# Patient Record
Sex: FEMALE | Race: WHITE | Employment: UNEMPLOYED | URBAN - METROPOLITAN AREA
[De-identification: names, ages, dates, MRNs, and addresses within clinical notes are randomized per-mention and may not be internally consistent; named-entity substitution may affect disease eponyms.]

---

## 2020-01-23 ENCOUNTER — HOSPITAL ENCOUNTER (OUTPATIENT)
Dept: CT IMAGING | Age: 55
Discharge: HOME | End: 2020-01-23
Attending: UROLOGY
Payer: COMMERCIAL

## 2020-01-23 DIAGNOSIS — N20.1 URETEROLITHIASIS: ICD-10-CM

## 2020-01-23 PROCEDURE — 74176 CT ABD & PELVIS W/O CONTRAST: CPT

## 2020-04-22 ENCOUNTER — HOSPITAL ENCOUNTER (OUTPATIENT)
Dept: MRI IMAGING | Age: 55
Discharge: HOME | End: 2020-04-22
Attending: ORTHOPAEDIC SURGERY
Payer: COMMERCIAL

## 2020-04-22 DIAGNOSIS — M47.22 OSTEOARTHRITIS OF SPINE WITH RADICULOPATHY, CERVICAL REGION: ICD-10-CM

## 2020-04-22 DIAGNOSIS — M54.2 CERVICALGIA: ICD-10-CM

## 2020-04-22 PROCEDURE — 72141 MRI NECK SPINE W/O DYE: CPT

## 2020-04-23 ENCOUNTER — HOSPITAL ENCOUNTER (OUTPATIENT)
Dept: GENERAL RADIOLOGY | Age: 55
Discharge: HOME | End: 2020-04-23
Attending: ORTHOPAEDIC SURGERY
Payer: COMMERCIAL

## 2020-04-23 ENCOUNTER — HOSPITAL ENCOUNTER (OUTPATIENT)
Dept: PREADMISSION TESTING | Age: 55
Discharge: HOME | End: 2020-04-23
Payer: COMMERCIAL

## 2020-04-23 VITALS
WEIGHT: 150.57 LBS | OXYGEN SATURATION: 97 % | HEART RATE: 77 BPM | DIASTOLIC BLOOD PRESSURE: 91 MMHG | SYSTOLIC BLOOD PRESSURE: 149 MMHG | BODY MASS INDEX: 27.71 KG/M2 | TEMPERATURE: 98.1 F | HEIGHT: 62 IN

## 2020-04-23 LAB
25(OH)D3 SERPL-MCNC: 37 NG/ML (ref 30–100)
ABO + RH BLD: NORMAL
ALBUMIN SERPL-MCNC: 3.9 G/DL (ref 3.5–5)
ALBUMIN/GLOB SERPL: 1.1 {RATIO} (ref 1.1–2.2)
ALP SERPL-CCNC: 79 U/L (ref 45–117)
ALT SERPL-CCNC: 38 U/L (ref 12–78)
ANION GAP SERPL CALC-SCNC: 4 MMOL/L (ref 5–15)
APPEARANCE UR: ABNORMAL
AST SERPL-CCNC: 18 U/L (ref 15–37)
BACTERIA URNS QL MICRO: ABNORMAL /HPF
BILIRUB SERPL-MCNC: 0.2 MG/DL (ref 0.2–1)
BILIRUB UR QL: NEGATIVE
BLOOD GROUP ANTIBODIES SERPL: NORMAL
BUN SERPL-MCNC: 15 MG/DL (ref 6–20)
BUN/CREAT SERPL: 23 (ref 12–20)
CALCIUM SERPL-MCNC: 9.3 MG/DL (ref 8.5–10.1)
CHLORIDE SERPL-SCNC: 106 MMOL/L (ref 97–108)
CO2 SERPL-SCNC: 29 MMOL/L (ref 21–32)
COLOR UR: ABNORMAL
CREAT SERPL-MCNC: 0.64 MG/DL (ref 0.55–1.02)
EPITH CASTS URNS QL MICRO: ABNORMAL /LPF
ERYTHROCYTE [DISTWIDTH] IN BLOOD BY AUTOMATED COUNT: 12.7 % (ref 11.5–14.5)
EST. AVERAGE GLUCOSE BLD GHB EST-MCNC: 111 MG/DL
GLOBULIN SER CALC-MCNC: 3.6 G/DL (ref 2–4)
GLUCOSE SERPL-MCNC: 102 MG/DL (ref 65–100)
GLUCOSE UR STRIP.AUTO-MCNC: NEGATIVE MG/DL
HBA1C MFR BLD: 5.5 % (ref 4–5.6)
HCG SERPL QL: NEGATIVE
HCT VFR BLD AUTO: 47.7 % (ref 35–47)
HGB BLD-MCNC: 15.6 G/DL (ref 11.5–16)
HGB UR QL STRIP: ABNORMAL
INR PPP: 1 (ref 0.9–1.1)
KETONES UR QL STRIP.AUTO: NEGATIVE MG/DL
LEUKOCYTE ESTERASE UR QL STRIP.AUTO: ABNORMAL
MCH RBC QN AUTO: 31.1 PG (ref 26–34)
MCHC RBC AUTO-ENTMCNC: 32.7 G/DL (ref 30–36.5)
MCV RBC AUTO: 95.2 FL (ref 80–99)
NITRITE UR QL STRIP.AUTO: NEGATIVE
NRBC # BLD: 0 K/UL (ref 0–0.01)
NRBC BLD-RTO: 0 PER 100 WBC
PH UR STRIP: 7.5 [PH] (ref 5–8)
PLATELET # BLD AUTO: 307 K/UL (ref 150–400)
PMV BLD AUTO: 11.9 FL (ref 8.9–12.9)
POTASSIUM SERPL-SCNC: 4.1 MMOL/L (ref 3.5–5.1)
PREALB SERPL-MCNC: 29.3 MG/DL (ref 20–40)
PROT SERPL-MCNC: 7.5 G/DL (ref 6.4–8.2)
PROT UR STRIP-MCNC: NEGATIVE MG/DL
PROTHROMBIN TIME: 10.4 SEC (ref 9–11.1)
RBC # BLD AUTO: 5.01 M/UL (ref 3.8–5.2)
RBC #/AREA URNS HPF: ABNORMAL /HPF (ref 0–5)
SODIUM SERPL-SCNC: 139 MMOL/L (ref 136–145)
SP GR UR REFRACTOMETRY: 1.01 (ref 1–1.03)
SPECIMEN EXP DATE BLD: NORMAL
UA: UC IF INDICATED,UAUC: ABNORMAL
UROBILINOGEN UR QL STRIP.AUTO: 1 EU/DL (ref 0.2–1)
WBC # BLD AUTO: 9.8 K/UL (ref 3.6–11)
WBC URNS QL MICRO: ABNORMAL /HPF (ref 0–4)

## 2020-04-23 PROCEDURE — 36415 COLL VENOUS BLD VENIPUNCTURE: CPT

## 2020-04-23 PROCEDURE — 84134 ASSAY OF PREALBUMIN: CPT

## 2020-04-23 PROCEDURE — 80053 COMPREHEN METABOLIC PANEL: CPT

## 2020-04-23 PROCEDURE — 97116 GAIT TRAINING THERAPY: CPT

## 2020-04-23 PROCEDURE — 87086 URINE CULTURE/COLONY COUNT: CPT

## 2020-04-23 PROCEDURE — 93005 ELECTROCARDIOGRAM TRACING: CPT

## 2020-04-23 PROCEDURE — 85610 PROTHROMBIN TIME: CPT

## 2020-04-23 PROCEDURE — 82306 VITAMIN D 25 HYDROXY: CPT

## 2020-04-23 PROCEDURE — 86900 BLOOD TYPING SEROLOGIC ABO: CPT

## 2020-04-23 PROCEDURE — 97161 PT EVAL LOW COMPLEX 20 MIN: CPT

## 2020-04-23 PROCEDURE — 83036 HEMOGLOBIN GLYCOSYLATED A1C: CPT

## 2020-04-23 PROCEDURE — 71046 X-RAY EXAM CHEST 2 VIEWS: CPT

## 2020-04-23 PROCEDURE — 81001 URINALYSIS AUTO W/SCOPE: CPT

## 2020-04-23 PROCEDURE — 85027 COMPLETE CBC AUTOMATED: CPT

## 2020-04-23 PROCEDURE — 84703 CHORIONIC GONADOTROPIN ASSAY: CPT

## 2020-04-23 RX ORDER — PREGABALIN 150 MG/1
150 CAPSULE ORAL ONCE
Status: CANCELLED | OUTPATIENT
Start: 2020-04-28 | End: 2020-04-28

## 2020-04-23 RX ORDER — ACETAMINOPHEN 10 MG/ML
1000 INJECTION, SOLUTION INTRAVENOUS ONCE
Status: CANCELLED | OUTPATIENT
Start: 2020-04-28 | End: 2020-04-28

## 2020-04-23 RX ORDER — SODIUM CHLORIDE, SODIUM LACTATE, POTASSIUM CHLORIDE, CALCIUM CHLORIDE 600; 310; 30; 20 MG/100ML; MG/100ML; MG/100ML; MG/100ML
25 INJECTION, SOLUTION INTRAVENOUS CONTINUOUS
Status: CANCELLED | OUTPATIENT
Start: 2020-04-28

## 2020-04-23 RX ORDER — ACETAMINOPHEN AND CODEINE PHOSPHATE 120; 12 MG/5ML; MG/5ML
SOLUTION ORAL DAILY
COMMUNITY

## 2020-04-23 NOTE — PROGRESS NOTES
[facility]  Physical Therapy Pre-surgery evaluation  [address] [facility], [address] [address]    PHYSICAL THERAPY PRE SPINE SURGERY EVALUATION  Patient: Ioana Matta (77 y.o. female)  Date: 4/23/2020  Primary Diagnosis: PAT TESTING       Precautions:        ASSESSMENT :  Based on the objective data described below, the patient presents with impaired sensation with intermittent neck and arm pain due to end stage degenerative joint disease in the cervical spine. Discussed anticipated disposition to home with possible discharge within a 1 to 2 day time frame post-surgery. Patient and  in agreement. Anticipate patient will need acute PT and OT orders based on expected deficits post surgery to reinforce BLT precautions and re-assess ADL/mobility skills. GOALS: (Goals have been discussed and agreed upon with patient.)  DISCHARGE GOALS: Time Frame: 1 DAY  1. Patient will demonstrate increased strength, range of motion, and pain control via a home exercise program in order to minimize functional deficits in preparation for their upcoming surgery. This will be achieved by using education, demonstration  and through the use of an informational handout including a home exercise program.  REHABILITATION POTENTIAL FOR STATED GOALS: Good     RECOMMENDATIONS AND PLANNED INTERVENTIONS: (Benefits and precautions of physical therapy have been discussed with the patient.)  · Home Exercise Program  TREATMENT PLAN EFFECTIVE DATES: 4/23/2020 to 4/23/2020  FREQUENCY/DURATION: Patient to continue to perform home exercise program at least twice daily until surgery. SUBJECTIVE:   Patient stated the doctor says I will spend the night after surgery.     OBJECTIVE DATA SUMMARY:   HISTORY:      Past Medical History:   Diagnosis Date    Anxiety     Hypertension     Ill-defined condition 2020    kidney stones     Past Surgical History:   Procedure Laterality Date    HX GYN  1988    c/section under GA    HX HEENT  1986    tonsilectomy       Prior Level of Function/Home Situation: independent all ADLs and walking without devices, but pain in neck increases with any house cleaning or activities that involve prolonged use of UEs. Personal factors and/or comorbidities impacting plan of care: anxiety per patient. Home Situation  Home Environment: Private residence  # Steps to Enter: 3  Rails to Enter: Yes  Hand Rails : Left  Wheelchair Ramp: No  One/Two Story Residence: Two story  # of Interior Steps: 12  Interior Rails: Both  Lift Chair Available: No  Living Alone: No  Support Systems: Spouse/Significant Other/Partner, Child(ct), Friends \ neighbors, Family member(s)  Patient Expects to be Discharged to[de-identified] Private residence  Current DME Used/Available at Home: None  Tub or Shower Type: Tub/Shower combination    EXAMINATION/PRESENTATION/DECISION MAKING:     ADLs (Current Functional Status): Bathing/Showering:   [x] Independent  [] Requires Assistance from Someone  [] Sponge Bath Only   Ambulation:  [x] Independent  [x] Walk Indoors Only  [x] Walk Outdoors  [] Use Assistive Device  [] Use Wheelchair Only     Dressing:  [x] Independent    Requires Assistance from Someone for:  [] Sock/Shoes  [] Pants  [] Everything   Household Activities:  [] Routine house and yard work  [x] Light Housework Only  [] None         Critical Behavior:  Neurologic State: Alert  Orientation Level: Oriented X4          Strength:    Strength:  Within functional limits                        Tone & Sensation:   Tone: Normal              Sensation: Impaired(numbness B hands)                   Range Of Motion:  AROM: Within functional limits           PROM: Within functional limits           Coordination:  Coordination: Within functional limits    Functional Mobility:  Transfers:  Sit to Stand: Independent  Stand to Sit: Independent        Bed to Chair: Independent              Balance:   Sitting: Intact  Standing: Intact  Ambulation/Gait Training:  Distance (ft): 150 Feet (ft)     Ambulation - Level of Assistance: Independent     Gait Description (WDL): Within defined limits(normal gait pattern)     Right Side Weight Bearing: Full  Left Side Weight Bearing: Full                         Functional Measure:  10 Meter walk test:  (Specify if any supplemental oxygen is used, the type, pre, during and post sats.)    Self-Selected Or Fast-Velocity: Self Selected Velocity  Trial 1 (Time to Walk 10 Meters): 7.3 Seconds  Trial 2 (Time to Walk 10 Meters): 6.7 Seconds  Trial 3 (Time to Walk 10 Meters): 6.6 Seconds  Average : 6.9 Seconds  Score (Meters/Second): 1.4 Meters/Second             Walking Speed (m/s)  Modifier Scale Age 52-63 Age 61-76 Age 66-77 Age 80-80    Male Female Male Female Male Female Male Female   CH   0% Impaired ? 1.39 ? 1.40 ? 1.36 ? 1.30 ? 1.33 ? 1.27 ? 1.21 ? 1.15   CI   1-19% Impaired 1.11-1.38 1.12-1.39 1.09-1.35 1.04-1.29 1.06-1.32 1.01-1.26 0.96-1.20 0.92-1.14   CJ   20-39% Impaired 0.83-1.10 0.84-1.11 0.82-1.08 0.78-1.03 0.80-1.05 0.76-1.00 0.72-0.95 0.69-0.91   CK   40-59% Impaired 0.56-0.82 0.57-0.83 0.54-0.81 0.52-0.77 0.53-0.79 0.51-0.75 0.48-0.71 0.46-0.68   CL   60-79% Impaired 0.28-0.55 0.28-0.56 0.27-0.53 0.26-0.51 0.27-0.52 0.25-0.50 0.24-0.49 0.23-0.45   CM   80-99% Impaired 0.01-0.28 < 0.01-0.28 < 0.01-0.27 < 0.01-0.26 0.01-0.27 0.01-0.24 0.01-0.23 0.01-0.22   CN   100% Impaired Cannot Perform   Minimal Detectable Change (MDC-90) = 0.1 m/s  [employer] ISMS \"Comfortable and maximum walking speed of adults aged 20-79 years: reference values and determinants. \" Age and Agin Volume 26(1):15-9. Candido Franklin. \"Age- and gender-related test performance in community-dwelling elderly people: Six-Minute Walk Test, Manzano Balance Scale, Timed Up & Go Test, and gait speeds. \" Physical Therapy: 2002 Volume 82(2):128-37. Zoranfalguni Pretty DM, Irene VICK, Gene Mi JD, [facility] JONES.  \"Assessing stability and change of four performance measures: a longitudinal study evaluating outcome following total hip and knee arthroplasty. \" [facility] Musculoskeletal Disorders: 2005 Volume 6(3). Estuardo Ahuja, PhD; Lisandro Gonzalez, PhD. Johnnie Hernandez Paper: \"Walking Speed: the Sixth Vital Sign\" Journal of Geriatric Physical Therapy: 2009 - Volume 32 - Issue 2 - p 25 . Pain:  Pain Scale 1: Numeric (0 - 10)  Pain Intensity 1: 7  Pain Location 1: Hand  Pain Orientation 1: Left;Right  Pain Description 1: Tingling;Numb       Radicular Pain:   Intermittent from neck to either arm/hands    Activity Tolerance:   Good. Patient []   does  [x]   does not demonstrate signs/symptoms of shortness of breath/dyspnea on exertion/respiratory distress. COMMUNICATION/EDUCATION:   The patient was educated on:  [x]         Early post operative mobility is imperative to achieve a patient's desired outcomes and to restore biological function. [x]         Post operative spinal precautions may/may not be applicable. These precautions are based on the patient's physician and the procedure(s) performed. [x]         Spinal precautions including:  ·   No bending forward, sideways, or backwards  ·   No twisting   ·   No lifting more than 5-10 pounds  ·   No sitting longer than 30-60 minutes at a time  ·   Cervical aspen collar should be on at all times    The patients plan of care was discussed as follows:   [x]         The patient verbalized understanding of her plan in preparation for their upcoming surgery  []         The patient's  was present for this session  [x]        The patient reports that he/she does not have a  identified at this time  []         The  verbalized understanding of the education regarding the patient's upcoming surgery  [x]         Patient/family agree to work toward stated goals and plan of care.   []         Patient understands intent and goals of therapy, but is neutral about his/her participation. []         Patient is unable to participate in goal setting and plan of care.       Thank you for this referral.  Winnie Ballard, PT    Time Calculation: 14 mins

## 2020-04-23 NOTE — PERIOP NOTES
[facility]  Joint/Spine Preoperative Instructions        Surgery Date April 28,20       Time of Garland Petersen  Contact # [phone]  1. On the day of your surgery, please report to the Surgical Services Registration Desk and sign in at your designated time. The Surgery Center is located to the right of the Emergency Room. 2. You must have someone with you to drive you home. You should not drive a car for 24 hours following surgery. Please make arrangements for a friend or family member to stay with you for the first 24 hours after your surgery. 3. No food after midnight 4/27/20*. Medications morning of surgery should be taken with a sip of water. Please follow pre-surgery drink instructions that were given at your Pre Admission Testing appointment. 4. We recommend you do not drink any alcoholic beverages for 24 hours before and after your surgery. 5. Contact your surgeons office for instructions on the following medications: non-steroidal anti-inflammatory drugs (i.e. Advil, Aleve), vitamins, and supplements. (Some surgeons will want you to stop these medications prior to surgery and others may allow you to take them)  **If you are currently taking Plavix, Coumadin, Aspirin and/or other blood-thinning agents, contact your surgeon for instructions. ** Your surgeon will partner with the physician prescribing these medications to determine if it is safe to stop or if you need to continue taking. Please do not stop taking these medications without instructions from your surgeon    6. Wear comfortable clothes. Wear glasses instead of contacts. Do not bring any money or jewelry. Please bring picture ID, insurance card, and any prearranged co-payment or hospital payment. Do not wear make-up, particularly mascara the morning of your surgery. Do not wear nail polish, particularly if you are having foot /hand surgery.   Wear your hair loose or down, no ponytails, buns, stanford pins or clips. All body piercings must be removed. Please shower with antibacterial soap for three consecutive days before and on the morning of surgery, but do not apply any lotions, powders or deodorants after the shower on the day of surgery. Please use a fresh towels after each shower. Please sleep in clean clothes and change bed linens the night before surgery. Please do not shave for 48 hours prior to surgery. Shaving of the face is acceptable. 7. You should understand that if you do not follow these instructions your surgery may be cancelled. If your physical condition changes (I.e. fever, cold or flu) please contact your surgeon as soon as possible. 8. It is important that you be on time. If a situation occurs where you may be late, please call [phone] (OR Holding Area). 9. If you have any questions and or problems, please call [phone] (Pre-admission Testing). 10. Your surgery time may be subject to change. You will receive a phone call the evening prior if your time changes. 11.  If having outpatient surgery, you must have someone to drive you here, stay with you during the duration of your stay, and to drive you home at time of discharge. 12.   In an effort to improve the efficiency, privacy, and safety for all of our Pre-op patients visitors are not allowed in the Holding area. Once you arrive and are registered your family/visitors will be asked to remain in the waiting room. The Pre-op staff will get you from the Surgical Waiting Area and will explain to you and your family/visitors that the Pre-op phase is beginning. The staff will answer any questions and provide instructions for tracking of the patient, by use of the existing tracking number and color-coded status board in the waiting room.   At this time the staff will also ask for your designated spokesperson information in the event that the physician or staff need to provide an update or obtain any pertinent information. The designated spokesperson will be notified if the physician needs to speak to family during the pre-operative phase. If at any time your family/visitors has questions or concerns they may approach the volunteer desk in the waiting area for assistance. Special Instructions:   As a precaution [facility] has implemented a restricted visitation policy to limit to 1 visitor per patient. No visitors under the age of 15 will be permitted in the Hospital.  If you are ill, please call to reschedule your procedure. (For pediatric patients, 2 visitors per patient)  Follow surgeon instructions  Practice Incentive Spirometry twice per day ( ten times each)  TAKE ALL MEDICATIONS THE DAY OF SURGERY EXCEPT:none      I understand a pre-operative phone call will be made to verify my surgery time. In the event that I am not available, I give permission for a message to be left on my answering service and/or with another person?  yes         ___________________      __________   _________    (Signature of Patient)             (Witness)                (Date and Time)

## 2020-04-23 NOTE — PERIOP NOTES
Incentive Spirometer        Using the incentive spirometer helps expand the small air sacs of your lungs, helps you breathe deeply, and helps improve your lung function. Use your incentive spirometer twice a day (10 breaths each time) prior to surgery. How to Use Your Incentive Spirometer:  1. Hold the incentive spirometer in an upright position. 2. Breathe out as usual.   3. Place the mouthpiece in your mouth and seal your lips tightly around it. 4. Take a deep breath. Breathe in slowly and as deeply as possible. Keep the blue flow rate guide between the arrows. 5. Hold your breath as long as possible. Then exhale slowly and allow the piston to fall to the bottom of the column. 6. Rest for a few seconds and repeat steps one through five at least 10 times. PAT Tidal Volume_________2000_________  x________2________  Date________4/23/20_______________    Vernona Generous THE INCENTIVE SPIROMETER WITH YOU TO THE HOSPITAL ON THE DAY OF YOUR SURGERY. Opportunity given to ask and answer questions as well as to observe return demonstration.     Patient signature_____________________________    Witness____________________________

## 2020-04-23 NOTE — PERIOP NOTES
Hibiclens/Chlorhexidine    Preventing Infections Before and After  Your Surgery    IMPORTANT INSTRUCTIONS    Please read and follow these instructions carefully. If you are unable to comply with the below instructions your procedure will be cancelled. Every Night for Three (3) nights before your surgery:  1. Shower with an antibacterial soap, such as Dial, or the soap provided at your preassessment appointment. A shower is better than a bath for cleaning your skin. 2. If needed, ask someone to help you reach all areas of your body. Dont forget to clean your belly button with every shower. The night before your surgery: If you lose your Hibiclens/chlorhexidine please contact surgery center or you can purchase it at a local pharmacy  1. On the night before your surgery, shower with an antibacterial soap, such as Dial, or the soap provided at your preassessment appointment. 2. With one packet of Hibiclens/Chlorhexidine in hand, turn water off.  3. Apply Hibiclens antiseptic skin cleanser with a clean, freshly washed washcloth. ? Gently apply to your body from chin to toes (except the genital area) and especially the area(s) where your incision(s) will be. ? Leave Hibiclens/Chlorhexidine on your skin for at least 20 seconds. CAUTION: If needed, Hibiclens/chlorhexidine may be used to clean the folds of skin of the legs (such as in the area of the groin) and on your buttocks and hips. However, do not use Hibiclens/Chlorhexidine above the neck or in the genital area (your bottom) or put inside any area of your body. 4. Turn the water back on and rinse. 5. Dry gently with a clean, freshly washed towel. 6. After your shower, do not use any powder, deodorant, perfumes or lotion. 7. Use clean, freshly washed towels and washcloths every time you shower. 8. Wear clean, freshly washed pajamas to bed the night before surgery. 9. Sleep on clean, freshly washed sheets.   10. Do not allow pets to sleep in your bed with you. The Morning of your surgery:  1. Shower again thoroughly with an antibacterial soap, such as Dial or the soap provided at your preassessment appointment. If needed, ask someone for help to reach all areas of your body. Dont forget to clean your belly button! Rinse. 2. Dry gently with a clean, freshly washed towel. 3. After your shower, do not use any powder, deodorant, perfumes or lotion prior to surgery. 4. Put on clean, freshly washed clothing. Tips to help prevent infections after your surgery:  1. Protect your surgical wound from germs:  ? Hand washing is the most important thing you and your caregivers can do to prevent infections. ? Keep your bandage clean and dry! ? Do not touch your surgical wound. 2. Use clean, freshly washed towels and washcloths every time you shower; do not share bath linens with others. 3. Until your surgical wound is healed, wear clothing and sleep on bed linens each day that are clean and freshly washed. 4. Do not allow pets to sleep in your bed with you or touch your surgical wound. 5. Do not smoke  smoking delays wound healing. This may be a good time to stop smoking. 6. If you have diabetes, it is important for you to manage your blood sugar levels properly before your surgery as well as after your surgery. Poorly managed blood sugar levels slow down wound healing and prevent you from healing completely. If you lose your Hibiclens/chlorhexidine, please call the [facility], or it is available for purchase at your pharmacy.                ___________________      ___________________      ________________  (Signature of Patient)          (Witness)                   (Date and Time)

## 2020-04-23 NOTE — PERIOP NOTES

## 2020-04-23 NOTE — ADVANCED PRACTICE NURSE
PAT Nurse Practitioner   Pre-Operative Chart Review/Assessment:-ORTHOPEDIC/NEUROSURGICAL SPINE                Patient Name:  Marck Perez                                                         Age:   47 y.o.    :  1965     Today's Date:  2020     Date of PAT:   20      Date of Surgery:    20     Procedure(s):  C4-7 ACDF     Surgeon:   Cynthia Suárez     Medical Clearance:  PCP is Dr. Maria Luisa Mulligan:      1)  Cardiac Clearance:  Not requested       2)  Diabetic Treatment Consult:  Not indicated-A1C 5.5      3)  Sleep Apnea evaluation:   Not indicated-BLAISE 1      4) Treatment for MRSA/Staph Aureus:  Negative       5) Additional Concerns:  Anxiety                Vital Signs:         Vitals:    20 1321   BP: (!) 149/91   Pulse: 77   Temp: 98.1 °F (36.7 °C)   SpO2: 97%   Weight: 68.3 kg (150 lb 9.2 oz)   Height: 5' 1.5\" (1.562 m)   LMP: 2020            ____________________________________________  PAST MEDICAL HISTORY  Past Medical History:   Diagnosis Date    Anxiety     Hypertension     Ill-defined condition     kidney stones      ____________________________________________  PAST SURGICAL HISTORY  Past Surgical History:   Procedure Laterality Date    HX GYN      c/section under GA    HX HEENT      tonsilectomy      ____________________________________________  HOME MEDICATIONS    Current Outpatient Medications   Medication Sig    norethindrone (MICRONOR) 0.35 mg tab Take  by mouth daily.  diazepam (VALIUM) 5 mg tablet Take 1 Tab by mouth every eight (8) hours as needed for Anxiety.      No current facility-administered medications for this encounter.       ____________________________________________  ALLERGIES  Allergies   Allergen Reactions    Penicillin G Hives     Old allergy - does not recall if keflex      ____________________________________________  SOCIAL HISTORY  Social History     Tobacco Use    Smoking status: Light Tobacco Smoker Packs/day: 0.25     Years: 25.00     Pack years: 6.25    Smokeless tobacco: Never Used   Substance Use Topics    Alcohol use: Not Currently      ____________________________________________        Labs:   Results for Yumiko Gutierrez (MRN [id number]) as of 4/27/2020 07:25   Ref.  Range 4/22/2020 13:59 4/23/2020 12:54   WBC Latest Ref Range: 3.6 - 11.0 K/uL  9.8   NRBC Latest Ref Range: 0  WBC  0.0   RBC Latest Ref Range: 3.80 - 5.20 M/uL  5.01   HGB Latest Ref Range: 11.5 - 16.0 g/dL  15.6   HCT Latest Ref Range: 35.0 - 47.0 %  47.7 (H)   MCV Latest Ref Range: 80.0 - 99.0 FL  95.2   MCH Latest Ref Range: 26.0 - 34.0 PG  31.1   MCHC Latest Ref Range: 30.0 - 36.5 g/dL  32.7   RDW Latest Ref Range: 11.5 - 14.5 %  12.7   PLATELET Latest Ref Range: 150 - 400 K/uL  307   MPV Latest Ref Range: 8.9 - 12.9 FL  11.9   ABSOLUTE NRBC Latest Ref Range: 0.00 - 0.01 K/uL  0.00   Color Latest Units:    YELLOW/STRAW   Appearance Latest Ref Range: CLEAR    CLOUDY (A)   Specific gravity Latest Ref Range: 1.003 - 1.030    1.014   pH (UA) Latest Ref Range: 5.0 - 8.0    7.5   Protein Latest Ref Range: NEG mg/dL  Negative   Glucose Latest Ref Range: NEG mg/dL  Negative   Ketone Latest Ref Range: NEG mg/dL  Negative   Blood Latest Ref Range: NEG    SMALL (A)   Bilirubin Latest Ref Range: NEG    Negative   Urobilinogen Latest Ref Range: 0.2 - 1.0 EU/dL  1.0   Nitrites Latest Ref Range: NEG    Negative   Leukocyte Esterase Latest Ref Range: NEG    TRACE (A)   Epithelial cells Latest Ref Range: FEW /lpf  FEW   WBC Latest Ref Range: 0 - 4 /hpf  5-10   RBC Latest Ref Range: 0 - 5 /hpf  10-20   Bacteria Latest Ref Range: NEG /hpf  1+ (A)   INR Latest Ref Range: 0.9 - 1.1    1.0   Prothrombin time Latest Ref Range: 9.0 - 11.1 sec  10.4   Sodium Latest Ref Range: 136 - 145 mmol/L  139   Potassium Latest Ref Range: 3.5 - 5.1 mmol/L  4.1   Chloride Latest Ref Range: 97 - 108 mmol/L  106   CO2 Latest Ref Range: 21 - 32 mmol/L  29   Anion gap Latest Ref Range: 5 - 15 mmol/L  4 (L)   Glucose Latest Ref Range: 65 - 100 mg/dL  102 (H)   BUN Latest Ref Range: 6 - 20 MG/DL  15   Creatinine Latest Ref Range: 0.55 - 1.02 MG/DL  0.64   BUN/Creatinine ratio Latest Ref Range: 12 - 20    23 (H)   Calcium Latest Ref Range: 8.5 - 10.1 MG/DL  9.3   GFR est non-AA Latest Ref Range: >60 ml/min/1.73m2  >60   GFR est AA Latest Ref Range: >60 ml/min/1.73m2  >60   Bilirubin, total Latest Ref Range: 0.2 - 1.0 MG/DL  0.2   Protein, total Latest Ref Range: 6.4 - 8.2 g/dL  7.5   Albumin Latest Ref Range: 3.5 - 5.0 g/dL  3.9   Globulin Latest Ref Range: 2.0 - 4.0 g/dL  3.6   A-G Ratio Latest Ref Range: 1.1 - 2.2    1.1   ALT (SGPT) Latest Ref Range: 12 - 78 U/L  38   AST Latest Ref Range: 15 - 37 U/L  18   Alk.  phosphatase Latest Ref Range: 45 - 117 U/L  79   Hemoglobin A1c, (calculated) Latest Ref Range: 4.0 - 5.6 %  5.5   Est. average glucose Latest Units: mg/dL  111   HCG, Ql. Latest Ref Range: NEG    Negative   Prealbumin Latest Ref Range: 20.0 - 40.0 mg/dL  29.3   CULTURE, URINE Unknown  Rpt   CULTURE, MRSA Unknown  Rpt   Crossmatch Expiration Unknown  05/01/2020   TYPE & SCREEN Unknown  Rpt   Vitamin D 25-Hydroxy Latest Ref Range: 30 - 100 ng/mL  37.0   VITAMIN D, 25 HYDROXY Unknown  Rpt   MRI CERV SPINE WO CONT Unknown Rpt        CULTURE, URINE [[id number]] (Order [id number])   Microbiology   Date: 4/23/2020 Department: [facility] Preadmit Tsting Released By:  (auto-released) Authorizing: Mavis Menjivar MD   Specimen Information: Urine        Component Value Flag Ref Range Units Status   Special Requests:      Final   NO SPECIAL REQUESTS   Reflexed from [id number]    Culture result:      Final   No significant growth, <10,000 CFU/mL    Lab and Collection     CULTURE, URINE (Order: [id number]) - 4/23/2020   Result History     CULTURE, URINE (Order #[id number]) on 4/24/2020 - Order Result History Report   4/24/2020  2:39 PM - Fredo, Lab In [email or web address]     Specimen Information     Urine Collected: 4/23/2020 1254       Final Report Date/time     Reported date Reported time   Apr 24, 2020 14:39 EDT         Skin:   Denies open wounds, cuts, sores, rashes or other areas of concern in PAT assessment       Merly Franco NP

## 2020-04-24 LAB
ATRIAL RATE: 82 BPM
BACTERIA SPEC CULT: NORMAL
CALCULATED P AXIS, ECG09: 63 DEGREES
CALCULATED R AXIS, ECG10: 30 DEGREES
CALCULATED T AXIS, ECG11: 58 DEGREES
DIAGNOSIS, 93000: NORMAL
P-R INTERVAL, ECG05: 146 MS
Q-T INTERVAL, ECG07: 372 MS
QRS DURATION, ECG06: 72 MS
QTC CALCULATION (BEZET), ECG08: 434 MS
SERVICE CMNT-IMP: NORMAL
SERVICE CMNT-IMP: NORMAL
VENTRICULAR RATE, ECG03: 82 BPM

## 2020-04-27 NOTE — PERIOP NOTES
Phone call made to [facility] to check on medical clearance- spoke to Carson Song and she states she will look into this and give PAT a call back

## 2020-04-28 ENCOUNTER — ANESTHESIA EVENT (OUTPATIENT)
Dept: SURGERY | Age: 55
DRG: 472 | End: 2020-04-28
Payer: COMMERCIAL

## 2020-04-28 ENCOUNTER — APPOINTMENT (OUTPATIENT)
Dept: GENERAL RADIOLOGY | Age: 55
DRG: 472 | End: 2020-04-28
Attending: ORTHOPAEDIC SURGERY
Payer: COMMERCIAL

## 2020-04-28 ENCOUNTER — ANESTHESIA (OUTPATIENT)
Dept: SURGERY | Age: 55
DRG: 472 | End: 2020-04-28
Payer: COMMERCIAL

## 2020-04-28 ENCOUNTER — HOSPITAL ENCOUNTER (INPATIENT)
Age: 55
LOS: 2 days | Discharge: HOME | DRG: 472 | End: 2020-04-30
Attending: ORTHOPAEDIC SURGERY | Admitting: ORTHOPAEDIC SURGERY
Payer: COMMERCIAL

## 2020-04-28 DIAGNOSIS — Z98.1 S/P CERVICAL SPINAL FUSION: Primary | ICD-10-CM

## 2020-04-28 PROBLEM — M48.02 CERVICAL STENOSIS OF SPINAL CANAL: Status: ACTIVE | Noted: 2020-04-28

## 2020-04-28 PROCEDURE — 77030012407 HC DRN WND BARD -B: Performed by: ORTHOPAEDIC SURGERY

## 2020-04-28 PROCEDURE — 76060000035 HC ANESTHESIA 2 TO 2.5 HR: Performed by: ORTHOPAEDIC SURGERY

## 2020-04-28 PROCEDURE — 0RT30ZZ RESECTION OF CERVICAL VERTEBRAL DISC, OPEN APPROACH: ICD-10-PCS | Performed by: ORTHOPAEDIC SURGERY

## 2020-04-28 PROCEDURE — 77030003028 HC SUT VCRL J&J -A: Performed by: ORTHOPAEDIC SURGERY

## 2020-04-28 PROCEDURE — 77030008684 HC TU ET CUF COVD -B: Performed by: NURSE ANESTHETIST, CERTIFIED REGISTERED

## 2020-04-28 PROCEDURE — L0180 CER POST COL OCC/MAN SUP ADJ: HCPCS | Performed by: ORTHOPAEDIC SURGERY

## 2020-04-28 PROCEDURE — 74011250636 HC RX REV CODE- 250/636: Performed by: NURSE ANESTHETIST, CERTIFIED REGISTERED

## 2020-04-28 PROCEDURE — 0BH17EZ INSERTION OF ENDOTRACHEAL AIRWAY INTO TRACHEA, VIA NATURAL OR ARTIFICIAL OPENING: ICD-10-PCS | Performed by: ANESTHESIOLOGY

## 2020-04-28 PROCEDURE — 77030018846 HC SOL IRR STRL H20 ICUM -A: Performed by: ORTHOPAEDIC SURGERY

## 2020-04-28 PROCEDURE — 77030041248 HC GRFT BN OSTEOAMP BTUS -G: Performed by: ORTHOPAEDIC SURGERY

## 2020-04-28 PROCEDURE — 77030008462 HC STPLR SKN PROX J&J -A: Performed by: ORTHOPAEDIC SURGERY

## 2020-04-28 PROCEDURE — 77030005513 HC CATH URETH FOL11 MDII -B: Performed by: ORTHOPAEDIC SURGERY

## 2020-04-28 PROCEDURE — 77030034475 HC MISC IMPL SPN: Performed by: ORTHOPAEDIC SURGERY

## 2020-04-28 PROCEDURE — 77030031139 HC SUT VCRL2 J&J -A: Performed by: ORTHOPAEDIC SURGERY

## 2020-04-28 PROCEDURE — 77030040356 HC CORD BPLR FRCP COVD -A: Performed by: ORTHOPAEDIC SURGERY

## 2020-04-28 PROCEDURE — 76010000171 HC OR TIME 2 TO 2.5 HR INTENSV-TIER 1: Performed by: ORTHOPAEDIC SURGERY

## 2020-04-28 PROCEDURE — C1713 ANCHOR/SCREW BN/BN,TIS/BN: HCPCS | Performed by: ORTHOPAEDIC SURGERY

## 2020-04-28 PROCEDURE — 77030040361 HC SLV COMPR DVT MDII -B: Performed by: ORTHOPAEDIC SURGERY

## 2020-04-28 PROCEDURE — 77030008684 HC TU ET CUF COVD -B: Performed by: ANESTHESIOLOGY

## 2020-04-28 PROCEDURE — 77030026438 HC STYL ET INTUB CARD -A: Performed by: NURSE ANESTHETIST, CERTIFIED REGISTERED

## 2020-04-28 PROCEDURE — 77030018842 HC SOL IRR SOD CL 9% BAXT -A: Performed by: ORTHOPAEDIC SURGERY

## 2020-04-28 PROCEDURE — 74011000250 HC RX REV CODE- 250: Performed by: NURSE ANESTHETIST, CERTIFIED REGISTERED

## 2020-04-28 PROCEDURE — 76000 FLUOROSCOPY <1 HR PHYS/QHP: CPT

## 2020-04-28 PROCEDURE — 77030029099 HC BN WAX SSPC -A: Performed by: ORTHOPAEDIC SURGERY

## 2020-04-28 PROCEDURE — 77030004402 HC BUR NEUR STRY -C: Performed by: ORTHOPAEDIC SURGERY

## 2020-04-28 PROCEDURE — 76210000006 HC OR PH I REC 0.5 TO 1 HR: Performed by: ORTHOPAEDIC SURGERY

## 2020-04-28 PROCEDURE — 77030038933 HC CGE SPN FORTCR NANV -G: Performed by: ORTHOPAEDIC SURGERY

## 2020-04-28 PROCEDURE — 77030033138 HC SUT PGA STRATFX J&J -B: Performed by: ORTHOPAEDIC SURGERY

## 2020-04-28 PROCEDURE — 5A1935Z RESPIRATORY VENTILATION, LESS THAN 24 CONSECUTIVE HOURS: ICD-10-PCS | Performed by: ANESTHESIOLOGY

## 2020-04-28 PROCEDURE — 77030003029 HC SUT VCRL J&J -B: Performed by: ORTHOPAEDIC SURGERY

## 2020-04-28 PROCEDURE — 77030012961 HC IRR KT CYSTO/TUR ICUM -A: Performed by: ORTHOPAEDIC SURGERY

## 2020-04-28 PROCEDURE — 77030018836 HC SOL IRR NACL ICUM -A: Performed by: ORTHOPAEDIC SURGERY

## 2020-04-28 PROCEDURE — 77030002996 HC SUT SLK J&J -A: Performed by: ORTHOPAEDIC SURGERY

## 2020-04-28 PROCEDURE — 77030039267 HC ADH SKN EXOFIN S2SG -B: Performed by: ORTHOPAEDIC SURGERY

## 2020-04-28 PROCEDURE — 77030020268 HC MISC GENERAL SUPPLY: Performed by: ORTHOPAEDIC SURGERY

## 2020-04-28 PROCEDURE — 77030019908 HC STETH ESOPH SIMS -A: Performed by: NURSE ANESTHETIST, CERTIFIED REGISTERED

## 2020-04-28 PROCEDURE — 74011250637 HC RX REV CODE- 250/637: Performed by: ORTHOPAEDIC SURGERY

## 2020-04-28 PROCEDURE — 0RG20A0 FUSION OF 2 OR MORE CERVICAL VERTEBRAL JOINTS WITH INTERBODY FUSION DEVICE, ANTERIOR APPROACH, ANTERIOR COLUMN, OPEN APPROACH: ICD-10-PCS | Performed by: ORTHOPAEDIC SURGERY

## 2020-04-28 PROCEDURE — 65610000006 HC RM INTENSIVE CARE

## 2020-04-28 PROCEDURE — 76060000033 HC ANESTHESIA 1 TO 1.5 HR: Performed by: ORTHOPAEDIC SURGERY

## 2020-04-28 PROCEDURE — 74011000250 HC RX REV CODE- 250: Performed by: ORTHOPAEDIC SURGERY

## 2020-04-28 PROCEDURE — 74011250636 HC RX REV CODE- 250/636: Performed by: ANESTHESIOLOGY

## 2020-04-28 PROCEDURE — 77030011267 HC ELECTRD BLD COVD -A: Performed by: ORTHOPAEDIC SURGERY

## 2020-04-28 PROCEDURE — 0JC50ZZ EXTIRPATION OF MATTER FROM LEFT NECK SUBCUTANEOUS TISSUE AND FASCIA, OPEN APPROACH: ICD-10-PCS | Performed by: ORTHOPAEDIC SURGERY

## 2020-04-28 PROCEDURE — 77030002986 HC SUT PROL J&J -A: Performed by: ORTHOPAEDIC SURGERY

## 2020-04-28 PROCEDURE — 77030014650 HC SEAL MTRX FLOSEL BAXT -C: Performed by: ORTHOPAEDIC SURGERY

## 2020-04-28 PROCEDURE — 77030013079 HC BLNKT BAIR HGGR 3M -A: Performed by: NURSE ANESTHETIST, CERTIFIED REGISTERED

## 2020-04-28 PROCEDURE — 00NW0ZZ RELEASE CERVICAL SPINAL CORD, OPEN APPROACH: ICD-10-PCS | Performed by: ORTHOPAEDIC SURGERY

## 2020-04-28 PROCEDURE — 77030040506 HC DRN WND MDII -A: Performed by: ORTHOPAEDIC SURGERY

## 2020-04-28 PROCEDURE — 72040 X-RAY EXAM NECK SPINE 2-3 VW: CPT

## 2020-04-28 PROCEDURE — 74011250636 HC RX REV CODE- 250/636: Performed by: ORTHOPAEDIC SURGERY

## 2020-04-28 PROCEDURE — 01N10ZZ RELEASE CERVICAL NERVE, OPEN APPROACH: ICD-10-PCS | Performed by: ORTHOPAEDIC SURGERY

## 2020-04-28 PROCEDURE — 77030009868 HC PIN DISTR CASPR AESC -B: Performed by: ORTHOPAEDIC SURGERY

## 2020-04-28 PROCEDURE — 76010000161 HC OR TIME 1 TO 1.5 HR INTENSV-TIER 1: Performed by: ORTHOPAEDIC SURGERY

## 2020-04-28 PROCEDURE — 77030021678 HC GLIDESCP STAT DISP VERT -B: Performed by: ANESTHESIOLOGY

## 2020-04-28 PROCEDURE — 77030002916 HC SUT ETHLN J&J -A: Performed by: ORTHOPAEDIC SURGERY

## 2020-04-28 DEVICE — FORTICORE® FLAT CERVICAL SPACER 7X12X14, (6°)
Type: IMPLANTABLE DEVICE | Site: SPINE CERVICAL | Status: FUNCTIONAL
Brand: FORTICORE®

## 2020-04-28 DEVICE — FORTIBRIDGE® VARIABLE SCREW 4.5X14MM
Type: IMPLANTABLE DEVICE | Site: SPINE CERVICAL | Status: FUNCTIONAL
Brand: FORTIBRIDGE®

## 2020-04-28 DEVICE — FORTICORE® FLAT CERVICAL SPACER 6X12X14, (6°)
Type: IMPLANTABLE DEVICE | Site: SPINE CERVICAL | Status: FUNCTIONAL
Brand: FORTICORE®

## 2020-04-28 DEVICE — FORTIBRIDGE SELF-TAP VARIABLE SCREW 4MM X 14MM
Type: IMPLANTABLE DEVICE | Site: SPINE CERVICAL | Status: FUNCTIONAL
Brand: FORTIBRIDGETM

## 2020-04-28 RX ORDER — ROCURONIUM BROMIDE 10 MG/ML
INJECTION, SOLUTION INTRAVENOUS AS NEEDED
Status: DISCONTINUED | OUTPATIENT
Start: 2020-04-28 | End: 2020-04-29 | Stop reason: HOSPADM

## 2020-04-28 RX ORDER — LIDOCAINE HYDROCHLORIDE 20 MG/ML
INJECTION, SOLUTION EPIDURAL; INFILTRATION; INTRACAUDAL; PERINEURAL AS NEEDED
Status: DISCONTINUED | OUTPATIENT
Start: 2020-04-28 | End: 2020-04-28 | Stop reason: HOSPADM

## 2020-04-28 RX ORDER — SODIUM CHLORIDE 9 MG/ML
125 INJECTION, SOLUTION INTRAVENOUS CONTINUOUS
Status: DISPENSED | OUTPATIENT
Start: 2020-04-28 | End: 2020-04-29

## 2020-04-28 RX ORDER — SUCCINYLCHOLINE CHLORIDE 20 MG/ML
INJECTION INTRAMUSCULAR; INTRAVENOUS AS NEEDED
Status: DISCONTINUED | OUTPATIENT
Start: 2020-04-28 | End: 2020-04-29 | Stop reason: HOSPADM

## 2020-04-28 RX ORDER — SODIUM CHLORIDE 0.9 % (FLUSH) 0.9 %
5-40 SYRINGE (ML) INJECTION AS NEEDED
Status: DISCONTINUED | OUTPATIENT
Start: 2020-04-28 | End: 2020-04-29 | Stop reason: HOSPADM

## 2020-04-28 RX ORDER — MIDAZOLAM HYDROCHLORIDE 1 MG/ML
INJECTION, SOLUTION INTRAMUSCULAR; INTRAVENOUS AS NEEDED
Status: DISCONTINUED | OUTPATIENT
Start: 2020-04-28 | End: 2020-04-28 | Stop reason: HOSPADM

## 2020-04-28 RX ORDER — OXYCODONE HYDROCHLORIDE 5 MG/1
5 TABLET ORAL
Status: DISCONTINUED | OUTPATIENT
Start: 2020-04-28 | End: 2020-04-30 | Stop reason: HOSPADM

## 2020-04-28 RX ORDER — DIAZEPAM 5 MG/1
5 TABLET ORAL
Status: DISCONTINUED | OUTPATIENT
Start: 2020-04-28 | End: 2020-04-30 | Stop reason: HOSPADM

## 2020-04-28 RX ORDER — ROCURONIUM BROMIDE 10 MG/ML
INJECTION, SOLUTION INTRAVENOUS AS NEEDED
Status: DISCONTINUED | OUTPATIENT
Start: 2020-04-28 | End: 2020-04-28 | Stop reason: HOSPADM

## 2020-04-28 RX ORDER — FENTANYL CITRATE 50 UG/ML
25 INJECTION, SOLUTION INTRAMUSCULAR; INTRAVENOUS
Status: DISCONTINUED | OUTPATIENT
Start: 2020-04-28 | End: 2020-04-29 | Stop reason: HOSPADM

## 2020-04-28 RX ORDER — ONDANSETRON 2 MG/ML
INJECTION INTRAMUSCULAR; INTRAVENOUS AS NEEDED
Status: DISCONTINUED | OUTPATIENT
Start: 2020-04-28 | End: 2020-04-28 | Stop reason: HOSPADM

## 2020-04-28 RX ORDER — SODIUM CHLORIDE 0.9 % (FLUSH) 0.9 %
5-40 SYRINGE (ML) INJECTION AS NEEDED
Status: DISCONTINUED | OUTPATIENT
Start: 2020-04-28 | End: 2020-04-28

## 2020-04-28 RX ORDER — NEOSTIGMINE METHYLSULFATE 1 MG/ML
INJECTION, SOLUTION INTRAVENOUS AS NEEDED
Status: DISCONTINUED | OUTPATIENT
Start: 2020-04-28 | End: 2020-04-28 | Stop reason: HOSPADM

## 2020-04-28 RX ORDER — ACETAMINOPHEN 10 MG/ML
1000 INJECTION, SOLUTION INTRAVENOUS ONCE
Status: COMPLETED | OUTPATIENT
Start: 2020-04-28 | End: 2020-04-28

## 2020-04-28 RX ORDER — PHENYLEPHRINE HCL IN 0.9% NACL 0.4MG/10ML
SYRINGE (ML) INTRAVENOUS AS NEEDED
Status: DISCONTINUED | OUTPATIENT
Start: 2020-04-28 | End: 2020-04-29 | Stop reason: HOSPADM

## 2020-04-28 RX ORDER — SODIUM CHLORIDE 9 MG/ML
INJECTION, SOLUTION INTRAVENOUS
Status: DISCONTINUED | OUTPATIENT
Start: 2020-04-28 | End: 2020-04-29 | Stop reason: HOSPADM

## 2020-04-28 RX ORDER — FENTANYL CITRATE 50 UG/ML
INJECTION, SOLUTION INTRAMUSCULAR; INTRAVENOUS AS NEEDED
Status: DISCONTINUED | OUTPATIENT
Start: 2020-04-28 | End: 2020-04-28 | Stop reason: HOSPADM

## 2020-04-28 RX ORDER — CEFAZOLIN SODIUM/WATER 2 G/20 ML
2 SYRINGE (ML) INTRAVENOUS
Status: DISCONTINUED | OUTPATIENT
Start: 2020-04-28 | End: 2020-04-28

## 2020-04-28 RX ORDER — PROPOFOL 10 MG/ML
INJECTION, EMULSION INTRAVENOUS AS NEEDED
Status: DISCONTINUED | OUTPATIENT
Start: 2020-04-28 | End: 2020-04-28 | Stop reason: HOSPADM

## 2020-04-28 RX ORDER — CEFAZOLIN SODIUM 1 G/3ML
2 INJECTION, POWDER, FOR SOLUTION INTRAMUSCULAR; INTRAVENOUS ONCE
Status: DISCONTINUED | OUTPATIENT
Start: 2020-04-28 | End: 2020-04-28

## 2020-04-28 RX ORDER — SODIUM CHLORIDE, SODIUM LACTATE, POTASSIUM CHLORIDE, CALCIUM CHLORIDE 600; 310; 30; 20 MG/100ML; MG/100ML; MG/100ML; MG/100ML
25 INJECTION, SOLUTION INTRAVENOUS CONTINUOUS
Status: DISCONTINUED | OUTPATIENT
Start: 2020-04-28 | End: 2020-04-29

## 2020-04-28 RX ORDER — SODIUM CHLORIDE 0.9 % (FLUSH) 0.9 %
5-40 SYRINGE (ML) INJECTION EVERY 8 HOURS
Status: DISCONTINUED | OUTPATIENT
Start: 2020-04-28 | End: 2020-04-28

## 2020-04-28 RX ORDER — HYDROMORPHONE HYDROCHLORIDE 2 MG/ML
INJECTION, SOLUTION INTRAMUSCULAR; INTRAVENOUS; SUBCUTANEOUS AS NEEDED
Status: DISCONTINUED | OUTPATIENT
Start: 2020-04-28 | End: 2020-04-28 | Stop reason: HOSPADM

## 2020-04-28 RX ORDER — SODIUM CHLORIDE, SODIUM LACTATE, POTASSIUM CHLORIDE, CALCIUM CHLORIDE 600; 310; 30; 20 MG/100ML; MG/100ML; MG/100ML; MG/100ML
25 INJECTION, SOLUTION INTRAVENOUS CONTINUOUS
Status: DISCONTINUED | OUTPATIENT
Start: 2020-04-28 | End: 2020-04-29 | Stop reason: HOSPADM

## 2020-04-28 RX ORDER — LABETALOL HYDROCHLORIDE 5 MG/ML
INJECTION, SOLUTION INTRAVENOUS AS NEEDED
Status: DISCONTINUED | OUTPATIENT
Start: 2020-04-28 | End: 2020-04-28 | Stop reason: HOSPADM

## 2020-04-28 RX ORDER — DEXAMETHASONE SODIUM PHOSPHATE 4 MG/ML
INJECTION, SOLUTION INTRA-ARTICULAR; INTRALESIONAL; INTRAMUSCULAR; INTRAVENOUS; SOFT TISSUE AS NEEDED
Status: DISCONTINUED | OUTPATIENT
Start: 2020-04-28 | End: 2020-04-28 | Stop reason: HOSPADM

## 2020-04-28 RX ORDER — GLYCOPYRROLATE 0.2 MG/ML
INJECTION INTRAMUSCULAR; INTRAVENOUS AS NEEDED
Status: DISCONTINUED | OUTPATIENT
Start: 2020-04-28 | End: 2020-04-28 | Stop reason: HOSPADM

## 2020-04-28 RX ORDER — SUCCINYLCHOLINE CHLORIDE 20 MG/ML
INJECTION INTRAMUSCULAR; INTRAVENOUS AS NEEDED
Status: DISCONTINUED | OUTPATIENT
Start: 2020-04-28 | End: 2020-04-28 | Stop reason: HOSPADM

## 2020-04-28 RX ORDER — HYDROMORPHONE HYDROCHLORIDE 1 MG/ML
0.2 INJECTION, SOLUTION INTRAMUSCULAR; INTRAVENOUS; SUBCUTANEOUS
Status: DISCONTINUED | OUTPATIENT
Start: 2020-04-28 | End: 2020-04-29 | Stop reason: HOSPADM

## 2020-04-28 RX ORDER — CEFAZOLIN SODIUM 1 G/3ML
INJECTION, POWDER, FOR SOLUTION INTRAMUSCULAR; INTRAVENOUS AS NEEDED
Status: DISCONTINUED | OUTPATIENT
Start: 2020-04-28 | End: 2020-04-29 | Stop reason: HOSPADM

## 2020-04-28 RX ORDER — PROPOFOL 10 MG/ML
INJECTION, EMULSION INTRAVENOUS AS NEEDED
Status: DISCONTINUED | OUTPATIENT
Start: 2020-04-28 | End: 2020-04-29 | Stop reason: HOSPADM

## 2020-04-28 RX ORDER — PREGABALIN 75 MG/1
150 CAPSULE ORAL ONCE
Status: COMPLETED | OUTPATIENT
Start: 2020-04-28 | End: 2020-04-28

## 2020-04-28 RX ORDER — OXYCODONE HYDROCHLORIDE 5 MG/1
10 TABLET ORAL
Status: DISCONTINUED | OUTPATIENT
Start: 2020-04-28 | End: 2020-04-30 | Stop reason: HOSPADM

## 2020-04-28 RX ORDER — DIPHENHYDRAMINE HYDROCHLORIDE 50 MG/ML
12.5 INJECTION, SOLUTION INTRAMUSCULAR; INTRAVENOUS AS NEEDED
Status: ACTIVE | OUTPATIENT
Start: 2020-04-28 | End: 2020-04-28

## 2020-04-28 RX ORDER — SODIUM CHLORIDE, SODIUM LACTATE, POTASSIUM CHLORIDE, CALCIUM CHLORIDE 600; 310; 30; 20 MG/100ML; MG/100ML; MG/100ML; MG/100ML
25 INJECTION, SOLUTION INTRAVENOUS CONTINUOUS
Status: DISCONTINUED | OUTPATIENT
Start: 2020-04-28 | End: 2020-04-28 | Stop reason: HOSPADM

## 2020-04-28 RX ORDER — SODIUM CHLORIDE 0.9 % (FLUSH) 0.9 %
5-40 SYRINGE (ML) INJECTION EVERY 8 HOURS
Status: DISCONTINUED | OUTPATIENT
Start: 2020-04-28 | End: 2020-04-28 | Stop reason: HOSPADM

## 2020-04-28 RX ORDER — LIDOCAINE HYDROCHLORIDE 10 MG/ML
0.1 INJECTION, SOLUTION EPIDURAL; INFILTRATION; INTRACAUDAL; PERINEURAL AS NEEDED
Status: DISCONTINUED | OUTPATIENT
Start: 2020-04-28 | End: 2020-04-28 | Stop reason: HOSPADM

## 2020-04-28 RX ADMIN — LABETALOL HYDROCHLORIDE 10 MG: 5 INJECTION, SOLUTION INTRAVENOUS at 22:36

## 2020-04-28 RX ADMIN — Medication 80 MCG: at 23:24

## 2020-04-28 RX ADMIN — PHENYLEPHRINE HYDROCHLORIDE 120 MCG: 10 INJECTION INTRAVENOUS at 20:36

## 2020-04-28 RX ADMIN — Medication 120 MCG: at 23:28

## 2020-04-28 RX ADMIN — PHENYLEPHRINE HYDROCHLORIDE 200 MCG: 10 INJECTION INTRAVENOUS at 20:46

## 2020-04-28 RX ADMIN — PHENYLEPHRINE HYDROCHLORIDE 80 MCG: 10 INJECTION INTRAVENOUS at 20:33

## 2020-04-28 RX ADMIN — Medication 3 MG: at 21:59

## 2020-04-28 RX ADMIN — SODIUM CHLORIDE, POTASSIUM CHLORIDE, SODIUM LACTATE AND CALCIUM CHLORIDE: 600; 310; 30; 20 INJECTION, SOLUTION INTRAVENOUS at 20:03

## 2020-04-28 RX ADMIN — DEXAMETHASONE SODIUM PHOSPHATE 8 MG: 4 INJECTION, SOLUTION INTRAMUSCULAR; INTRAVENOUS at 20:25

## 2020-04-28 RX ADMIN — ROCURONIUM BROMIDE 25 MG: 10 INJECTION INTRAVENOUS at 23:21

## 2020-04-28 RX ADMIN — PHENYLEPHRINE HYDROCHLORIDE 40 MCG: 10 INJECTION INTRAVENOUS at 21:40

## 2020-04-28 RX ADMIN — PHENYLEPHRINE HYDROCHLORIDE 80 MCG: 10 INJECTION INTRAVENOUS at 21:22

## 2020-04-28 RX ADMIN — PROPOFOL 150 MG: 10 INJECTION, EMULSION INTRAVENOUS at 20:12

## 2020-04-28 RX ADMIN — ROCURONIUM BROMIDE 10 MG: 10 INJECTION INTRAVENOUS at 20:39

## 2020-04-28 RX ADMIN — CEFAZOLIN 2 G: 1 INJECTION, POWDER, FOR SOLUTION INTRAMUSCULAR; INTRAVENOUS; PARENTERAL at 23:28

## 2020-04-28 RX ADMIN — Medication 120 MCG: at 23:49

## 2020-04-28 RX ADMIN — SODIUM CHLORIDE, POTASSIUM CHLORIDE, SODIUM LACTATE AND CALCIUM CHLORIDE: 600; 310; 30; 20 INJECTION, SOLUTION INTRAVENOUS at 21:44

## 2020-04-28 RX ADMIN — Medication 160 MCG: at 23:35

## 2020-04-28 RX ADMIN — PROPOFOL 150 MG: 10 INJECTION, EMULSION INTRAVENOUS at 23:12

## 2020-04-28 RX ADMIN — WATER 2 G: 1 INJECTION INTRAMUSCULAR; INTRAVENOUS; SUBCUTANEOUS at 20:38

## 2020-04-28 RX ADMIN — PROPOFOL 50 MG: 10 INJECTION, EMULSION INTRAVENOUS at 20:17

## 2020-04-28 RX ADMIN — Medication 160 MCG: at 23:33

## 2020-04-28 RX ADMIN — PHENYLEPHRINE HYDROCHLORIDE 80 MCG: 10 INJECTION INTRAVENOUS at 21:33

## 2020-04-28 RX ADMIN — SUCCINYLCHOLINE CHLORIDE 160 MG: 20 INJECTION, SOLUTION INTRAMUSCULAR; INTRAVENOUS at 20:18

## 2020-04-28 RX ADMIN — FENTANYL CITRATE 100 MCG: 50 INJECTION, SOLUTION INTRAMUSCULAR; INTRAVENOUS at 20:12

## 2020-04-28 RX ADMIN — FENTANYL CITRATE 50 MCG: 50 INJECTION, SOLUTION INTRAMUSCULAR; INTRAVENOUS at 21:51

## 2020-04-28 RX ADMIN — HYDROMORPHONE HYDROCHLORIDE 0.5 MG: 2 INJECTION, SOLUTION INTRAMUSCULAR; INTRAVENOUS; SUBCUTANEOUS at 20:55

## 2020-04-28 RX ADMIN — LIDOCAINE HYDROCHLORIDE 60 MG: 20 INJECTION, SOLUTION INTRAVENOUS at 20:12

## 2020-04-28 RX ADMIN — Medication 160 MCG: at 23:56

## 2020-04-28 RX ADMIN — Medication 3 AMPULE: at 16:26

## 2020-04-28 RX ADMIN — ACETAMINOPHEN 1000 MG: 10 INJECTION, SOLUTION INTRAVENOUS at 20:25

## 2020-04-28 RX ADMIN — SODIUM CHLORIDE: 900 INJECTION, SOLUTION INTRAVENOUS at 23:10

## 2020-04-28 RX ADMIN — LIDOCAINE HYDROCHLORIDE 40 MG: 20 INJECTION, SOLUTION INTRAVENOUS at 22:11

## 2020-04-28 RX ADMIN — PREGABALIN 150 MG: 75 CAPSULE ORAL at 18:45

## 2020-04-28 RX ADMIN — MIDAZOLAM HYDROCHLORIDE 2 MG: 1 INJECTION, SOLUTION INTRAMUSCULAR; INTRAVENOUS at 20:06

## 2020-04-28 RX ADMIN — ROCURONIUM BROMIDE 5 MG: 10 INJECTION INTRAVENOUS at 20:12

## 2020-04-28 RX ADMIN — GLYCOPYRROLATE 0.4 MG: 0.2 INJECTION, SOLUTION INTRAMUSCULAR; INTRAVENOUS at 21:59

## 2020-04-28 RX ADMIN — HYDROMORPHONE HYDROCHLORIDE 0.5 MG: 2 INJECTION, SOLUTION INTRAMUSCULAR; INTRAVENOUS; SUBCUTANEOUS at 20:51

## 2020-04-28 RX ADMIN — ROCURONIUM BROMIDE 25 MG: 10 INJECTION INTRAVENOUS at 20:27

## 2020-04-28 RX ADMIN — FENTANYL CITRATE 25 MCG: 50 INJECTION, SOLUTION INTRAMUSCULAR; INTRAVENOUS at 22:01

## 2020-04-28 RX ADMIN — SUCCINYLCHOLINE CHLORIDE 140 MG: 20 INJECTION, SOLUTION INTRAMUSCULAR; INTRAVENOUS at 23:12

## 2020-04-28 RX ADMIN — Medication 160 MCG: at 23:51

## 2020-04-28 RX ADMIN — FENTANYL CITRATE 25 MCG: 50 INJECTION, SOLUTION INTRAMUSCULAR; INTRAVENOUS at 22:03

## 2020-04-28 RX ADMIN — ONDANSETRON HYDROCHLORIDE 4 MG: 2 INJECTION, SOLUTION INTRAMUSCULAR; INTRAVENOUS at 21:58

## 2020-04-28 NOTE — ANESTHESIA PREPROCEDURE EVALUATION
Relevant Problems   No relevant active problems       Anesthetic History   No history of anesthetic complications            Review of Systems / Medical History  Patient summary reviewed, nursing notes reviewed and pertinent labs reviewed    Pulmonary  Within defined limits                 Neuro/Psych   Within defined limits           Cardiovascular    Hypertension              Exercise tolerance: >4 METS     GI/Hepatic/Renal  Within defined limits              Endo/Other  Within defined limits           Other Findings   Comments: Cervicalgia     hcg -           Physical Exam    Airway  Mallampati: II  TM Distance: 4 - 6 cm  Neck ROM: normal range of motion   Mouth opening: Normal     Cardiovascular  Regular rate and rhythm,  S1 and S2 normal,  no murmur, click, rub, or gallop             Dental  No notable dental hx       Pulmonary  Breath sounds clear to auscultation               Abdominal  GI exam deferred       Other Findings            Anesthetic Plan    ASA: 2  Anesthesia type: general    Monitoring Plan: BIS      Induction: Intravenous  Anesthetic plan and risks discussed with: Patient

## 2020-04-28 NOTE — PERIOP NOTES
Spoke with patient in waiting area to make her aware surgery is delayed. Patient verbalized understanding. Made patient aware we would come pick her up for surgery when ready.

## 2020-04-28 NOTE — H&P
Progress notes     Expand All  Collapse All     ASSESSMENT:  (M54.2) Cervicalgia  (primary encounter diagnosis)  (M47.22) Osteoarthritis of spine with radiculopathy, cervical region  (M54.12) Cervical radiculopathy  (M48.02) Spinal stenosis in cervical region  (M48.02) Foraminal stenosis of cervical region  (M47.12) Cervical spondylosis with myelopathy     There is no problem list on file for this patient.       Impression:  Acute cervical myelopathy due to a large disc herniation at C4-5 with severe spinal cord compression and cord signal change. There is also spinal stenosis from C5 through 7.     PLAN:  We had a long discussion about the MRI results today and have gone through the procedure for a C4 through 7 ACDF. We have gone over the possible complications including but not limited to pain, scar, bleeding, infection, nonunion, damage to surrounding structures, death, paralysis, blindness, stroke, C5 palsy, dysphagia, posterior neck pain.   She understands and wants to proceed.     I have discussed the procedure in detail with the patient and mentioned complications, including but not limited to: death, permanent disability, heart attack, stroke, lung injury or infection, blindness, ileus, bladder or bowel problems, ureter injury, bleeding, nerve injury (including numbness, pain and weakness), paralysis (which may be permanent), failure to heal, failure to fuse bone together in fusion procedures, failure to relief symptoms, failure to relief pain, increased pain, need for further surgeries, failure or breakage or hardware, malpositioning of hardware, need to fuse or operate on additional levels determined either during or after surgery, destabilization of the spine (which may require fusion or later surgery), infections (which may or may not require additional surgery), dural tears (tears of the sac holding in nerves and spinal fluid), meningitis, voice changes, vocal cord injury, hoarseness, blood clots, pulmonary embolus, Angelic syndrome, recurrent disc herniation, diaphragm paralysis, and anesthetic complications. Comorbidities such as obesity, smoking, rheumatoid arthritis, chronic steroid use and diabetes increase these risks. The patient understands and wants to proceed.      The patient has been prescribed a rigid cervical collar pre-operatively for pain relief. The orthosis is medically necessary to reduce pain by restricting mobility of the trunk and to otherwise support weak spinal muscles and/or deformed spine. The patient will meet with our bracing coordinator to be fit for the brace.               Treatment Plan:       Orders Placed This Encounter    Surgical Posting Sheet    BMI >=25 PATIENT INSTRUCTIONS & EDUCATION         Follow-up: Return for Follow up 2-3 weeks after surgery.         HISTORY OF PRESENT ILLNESS:  Francesca Mao; [id number]   Age: 47 y.o. Sex: female   Pain score: Pain rating = 10-Worst pain ever out of 10      Chief Complaint: Pain of the Neck       History of present illness:  Francesca Mao is a 47 y.o. female with complaints of bilateral hand numbness, clumsiness. The pain has been present 2 months. It is described as clumsy, numb  and is there constantly. It is worse with nothing and better with nothing. Francesca Mao has tried physical therapy, oral steroids. The pain is rated 10 out of 10 on the VAS.       There are no active problems to display for this patient.             Family History   Problem Relation Age of Onset    Clotting disorder Neg Hx      Diabetes Neg Hx      Anesthesia problems Neg Hx      Coronary artery disease Neg Hx           Social History           Tobacco Use    Smoking status: Light Tobacco Smoker    Smokeless tobacco: Never Used   Substance Use Topics    Alcohol use: Not Currently        Medical History        Past Medical History:   Diagnosis Date    Anxiety              Surgical History   History reviewed.  No pertinent surgical history.           Current Outpatient Medications:     diazepam (VALIUM) 5 MG tablet, diazepam 5 mg tablet, Disp: , Rfl:     norethindrone (MICRONOR) 0.35 MG tablet, Take 1 tablet by mouth once daily, Disp: , Rfl:     traZODone (DESYREL) 50 MG tablet, , Disp: , Rfl:           Allergies   Allergen Reactions    Penicillin G Hives         ROS:   No new bowel or bladder incontinence. No fever. No saddle anesthesia.     OBJECTIVE:  There were no vitals filed for this visit.     Body mass index is 28.72 kg/m². , a BMI over 30 is considered obese and a BMI over 40 has been associated with a higher risk of surgical complications.     Constitutional: No acute distress. Well nourished. Respiratory:  No labored breathing. Cardiovascular:  No marked cyanosis. Skin:  No marked skin ulcers/lesions on bilateral upper or lower extremities. Psychiatric: Alert and oriented x3. Inspection: No gross deformity of bilateral upper or lower extremities. Musculoskeletal/Neurological:   She has 5/5 strength work muscle bilateral upper extremities. Plus 3 reflexes. Positive Ryan's. Inability to do tandem gait. Positive Romberg's. Full range of motion of the neck. Decreased sensation of both hands.        RESULTS REVIEWED:          Mri Cervical Spine Without Contrast (12811)     Result Date: 2020  [facility] CERV SPINE WO CONT Patient: Jb Dia : 1965 Date of Service: 2020 1:59:26 PM Reason For Exam: Ordering Provider: Lolis Higgins Physician: Rafael Pino Signing Date: 2020 2:58:33 PM EXAM: MRI CERV SPINE WO CONT INDICATION: . Cervicalgia COMPARISON: None TECHNIQUE: MR imaging of the cervical spine was performed using the following sequences: sagittal T1, T2, STIR;  axial T2, T1. CONTRAST:  None. FINDINGS: Study is mildly compromised by motion. There is normal alignment of the cervical spine. Vertebral body heights are maintained.  Marrow signal is normal. The craniocervical junction is intact. The paraspinal soft tissues are within normal limits. C2-C3: No herniation or stenosis. C3-C4: No herniation or stenosis. C4-C5: There is a large central herniation at C4-C5 with severe compression of the cervical cord with increased signal intensity in the cervical cord at this level consistent with edema. The neural foramina appear patent. C5-C6: There is disc space narrowing with posterior osteophyte/disc complex more prominent to the left resulting in moderately severe central stenosis and narrowing of the right neural foramen. C6-C7: There is disc space narrowing with a posterior osteophyte resulting in moderate central stenosis and mild narrowing of the neural foramina right greater than left. .  C7-T1: No herniation or stenosis. IMPRESSION: 1. There is a large herniation at C4-C5 compressing the cervical cord with increased signal intensity within the cervical cord at this level consistent with edema. 2. At C5-C6, there is disc space narrowing with posterior osteophyte/disc complex resulting in moderately severe central stenosis right side greater than left and moderate narrowing of the right neural foramen. 3. At C6-C7, there is disc space narrowing with a mild osteophyte/disc complex posteriorly resulting in moderate central stenosis and mild narrowing of neural foramina right greater than left. Results called to the office by myself. Amy Moss  Signing date/time: 4/22/2020  2:58 PM Signed by: Aracelis Chatterjee have independently reviewed the MRI of the cervical spine done at [facility] in April of he shows a large disc herniation at C4-5 with significant cord compression and cord signal change. There is also stenosis at C5-6 and 6 7 to a lesser degree.        I have instructed the patient to continue to work on their physician supervised home exercise program.      This note has been transcribed electronically using voice recognition and a trained scribe.   It is believed to be accurate, but may contain errors secondary to technological limitations and other factors.     Marianna Collier MD

## 2020-04-29 ENCOUNTER — APPOINTMENT (OUTPATIENT)
Dept: GENERAL RADIOLOGY | Age: 55
DRG: 472 | End: 2020-04-29
Attending: ANESTHESIOLOGY
Payer: COMMERCIAL

## 2020-04-29 ENCOUNTER — APPOINTMENT (OUTPATIENT)
Dept: GENERAL RADIOLOGY | Age: 55
DRG: 472 | End: 2020-04-29
Attending: ORTHOPAEDIC SURGERY
Payer: COMMERCIAL

## 2020-04-29 LAB
APTT PPP: 27.6 SEC (ref 22.1–32)
INR PPP: 1 (ref 0.9–1.1)
PROTHROMBIN TIME: 10.7 SEC (ref 9–11.1)
THERAPEUTIC RANGE,PTTT: NORMAL SECS (ref 58–77)

## 2020-04-29 PROCEDURE — 74011250636 HC RX REV CODE- 250/636: Performed by: NURSE ANESTHETIST, CERTIFIED REGISTERED

## 2020-04-29 PROCEDURE — 85730 THROMBOPLASTIN TIME PARTIAL: CPT

## 2020-04-29 PROCEDURE — 36415 COLL VENOUS BLD VENIPUNCTURE: CPT

## 2020-04-29 PROCEDURE — 74011000250 HC RX REV CODE- 250: Performed by: ORTHOPAEDIC SURGERY

## 2020-04-29 PROCEDURE — 74011250636 HC RX REV CODE- 250/636: Performed by: ANESTHESIOLOGY

## 2020-04-29 PROCEDURE — 72040 X-RAY EXAM NECK SPINE 2-3 VW: CPT

## 2020-04-29 PROCEDURE — 77030040922 HC BLNKT HYPOTHRM STRY -A

## 2020-04-29 PROCEDURE — 74011000250 HC RX REV CODE- 250: Performed by: NURSE ANESTHETIST, CERTIFIED REGISTERED

## 2020-04-29 PROCEDURE — 74011250637 HC RX REV CODE- 250/637: Performed by: ORTHOPAEDIC SURGERY

## 2020-04-29 PROCEDURE — 94003 VENT MGMT INPAT SUBQ DAY: CPT

## 2020-04-29 PROCEDURE — 74011250636 HC RX REV CODE- 250/636

## 2020-04-29 PROCEDURE — 74011250636 HC RX REV CODE- 250/636: Performed by: INTERNAL MEDICINE

## 2020-04-29 PROCEDURE — 71045 X-RAY EXAM CHEST 1 VIEW: CPT

## 2020-04-29 PROCEDURE — 74011000250 HC RX REV CODE- 250: Performed by: ANESTHESIOLOGY

## 2020-04-29 PROCEDURE — 74011250636 HC RX REV CODE- 250/636: Performed by: ORTHOPAEDIC SURGERY

## 2020-04-29 PROCEDURE — 85240 CLOT FACTOR VIII AHG 1 STAGE: CPT

## 2020-04-29 PROCEDURE — 85610 PROTHROMBIN TIME: CPT

## 2020-04-29 PROCEDURE — 65270000029 HC RM PRIVATE

## 2020-04-29 PROCEDURE — 94002 VENT MGMT INPAT INIT DAY: CPT

## 2020-04-29 RX ORDER — SODIUM CHLORIDE 0.9 % (FLUSH) 0.9 %
5-40 SYRINGE (ML) INJECTION EVERY 8 HOURS
Status: DISCONTINUED | OUTPATIENT
Start: 2020-04-29 | End: 2020-04-30 | Stop reason: HOSPADM

## 2020-04-29 RX ORDER — POLYETHYLENE GLYCOL 3350 17 G/17G
17 POWDER, FOR SOLUTION ORAL DAILY
Status: DISCONTINUED | OUTPATIENT
Start: 2020-04-29 | End: 2020-04-30 | Stop reason: HOSPADM

## 2020-04-29 RX ORDER — HYDROMORPHONE HYDROCHLORIDE 1 MG/ML
1 INJECTION, SOLUTION INTRAMUSCULAR; INTRAVENOUS; SUBCUTANEOUS
Status: DISPENSED | OUTPATIENT
Start: 2020-04-29 | End: 2020-04-30

## 2020-04-29 RX ORDER — PROPOFOL 10 MG/ML
INJECTION, EMULSION INTRAVENOUS
Status: DISCONTINUED | OUTPATIENT
Start: 2020-04-29 | End: 2020-04-29 | Stop reason: HOSPADM

## 2020-04-29 RX ORDER — HYDROMORPHONE HYDROCHLORIDE 1 MG/ML
.2-.5 INJECTION, SOLUTION INTRAMUSCULAR; INTRAVENOUS; SUBCUTANEOUS
Status: DISCONTINUED | OUTPATIENT
Start: 2020-04-29 | End: 2020-04-29 | Stop reason: HOSPADM

## 2020-04-29 RX ORDER — PROPOFOL 10 MG/ML
5-50 VIAL (ML) INTRAVENOUS
Status: DISCONTINUED | OUTPATIENT
Start: 2020-04-29 | End: 2020-04-29 | Stop reason: HOSPADM

## 2020-04-29 RX ORDER — MORPHINE SULFATE 10 MG/ML
2 INJECTION, SOLUTION INTRAMUSCULAR; INTRAVENOUS
Status: DISCONTINUED | OUTPATIENT
Start: 2020-04-29 | End: 2020-04-29 | Stop reason: HOSPADM

## 2020-04-29 RX ORDER — AMOXICILLIN 250 MG
1 CAPSULE ORAL 2 TIMES DAILY
Status: DISCONTINUED | OUTPATIENT
Start: 2020-04-29 | End: 2020-04-30 | Stop reason: HOSPADM

## 2020-04-29 RX ORDER — DEXAMETHASONE SODIUM PHOSPHATE 4 MG/ML
4 INJECTION, SOLUTION INTRA-ARTICULAR; INTRALESIONAL; INTRAMUSCULAR; INTRAVENOUS; SOFT TISSUE EVERY 8 HOURS
Status: DISCONTINUED | OUTPATIENT
Start: 2020-04-29 | End: 2020-04-30

## 2020-04-29 RX ORDER — LABETALOL HYDROCHLORIDE 5 MG/ML
10 INJECTION, SOLUTION INTRAVENOUS ONCE
Status: ACTIVE | OUTPATIENT
Start: 2020-04-29 | End: 2020-04-29

## 2020-04-29 RX ORDER — ONDANSETRON 2 MG/ML
4 INJECTION INTRAMUSCULAR; INTRAVENOUS
Status: ACTIVE | OUTPATIENT
Start: 2020-04-29 | End: 2020-04-30

## 2020-04-29 RX ORDER — FACIAL-BODY WIPES
10 EACH TOPICAL DAILY PRN
Status: DISCONTINUED | OUTPATIENT
Start: 2020-04-30 | End: 2020-04-30 | Stop reason: HOSPADM

## 2020-04-29 RX ORDER — ONDANSETRON 2 MG/ML
4 INJECTION INTRAMUSCULAR; INTRAVENOUS AS NEEDED
Status: DISCONTINUED | OUTPATIENT
Start: 2020-04-29 | End: 2020-04-29 | Stop reason: HOSPADM

## 2020-04-29 RX ORDER — HYDROXYZINE HYDROCHLORIDE 10 MG/1
10 TABLET, FILM COATED ORAL
Status: DISCONTINUED | OUTPATIENT
Start: 2020-04-29 | End: 2020-04-30 | Stop reason: HOSPADM

## 2020-04-29 RX ORDER — MIDAZOLAM HYDROCHLORIDE 1 MG/ML
INJECTION, SOLUTION INTRAMUSCULAR; INTRAVENOUS
Status: COMPLETED
Start: 2020-04-29 | End: 2020-04-29

## 2020-04-29 RX ORDER — SODIUM CHLORIDE 0.9 % (FLUSH) 0.9 %
5-40 SYRINGE (ML) INJECTION EVERY 8 HOURS
Status: CANCELLED | OUTPATIENT
Start: 2020-04-29

## 2020-04-29 RX ORDER — CYCLOBENZAPRINE HCL 10 MG
10 TABLET ORAL
Status: DISCONTINUED | OUTPATIENT
Start: 2020-04-29 | End: 2020-04-30 | Stop reason: HOSPADM

## 2020-04-29 RX ORDER — PROPOFOL 10 MG/ML
0-50 VIAL (ML) INTRAVENOUS
Status: DISCONTINUED | OUTPATIENT
Start: 2020-04-29 | End: 2020-04-29 | Stop reason: ALTCHOICE

## 2020-04-29 RX ORDER — GABAPENTIN 100 MG/1
100 CAPSULE ORAL 3 TIMES DAILY
Status: DISCONTINUED | OUTPATIENT
Start: 2020-04-29 | End: 2020-04-30 | Stop reason: HOSPADM

## 2020-04-29 RX ORDER — ACETAMINOPHEN 500 MG
1000 TABLET ORAL EVERY 6 HOURS
Status: DISCONTINUED | OUTPATIENT
Start: 2020-04-29 | End: 2020-04-30 | Stop reason: HOSPADM

## 2020-04-29 RX ORDER — FENTANYL CITRATE 50 UG/ML
25 INJECTION, SOLUTION INTRAMUSCULAR; INTRAVENOUS
Status: DISCONTINUED | OUTPATIENT
Start: 2020-04-29 | End: 2020-04-29 | Stop reason: HOSPADM

## 2020-04-29 RX ORDER — DIPHENHYDRAMINE HYDROCHLORIDE 50 MG/ML
12.5 INJECTION, SOLUTION INTRAMUSCULAR; INTRAVENOUS AS NEEDED
Status: DISCONTINUED | OUTPATIENT
Start: 2020-04-29 | End: 2020-04-29 | Stop reason: HOSPADM

## 2020-04-29 RX ORDER — MIDAZOLAM HYDROCHLORIDE 1 MG/ML
2 INJECTION, SOLUTION INTRAMUSCULAR; INTRAVENOUS ONCE
Status: COMPLETED | OUTPATIENT
Start: 2020-04-29 | End: 2020-04-29

## 2020-04-29 RX ORDER — SODIUM CHLORIDE, SODIUM LACTATE, POTASSIUM CHLORIDE, CALCIUM CHLORIDE 600; 310; 30; 20 MG/100ML; MG/100ML; MG/100ML; MG/100ML
25 INJECTION, SOLUTION INTRAVENOUS CONTINUOUS
Status: DISCONTINUED | OUTPATIENT
Start: 2020-04-29 | End: 2020-04-29 | Stop reason: HOSPADM

## 2020-04-29 RX ORDER — NALOXONE HYDROCHLORIDE 0.4 MG/ML
0.4 INJECTION, SOLUTION INTRAMUSCULAR; INTRAVENOUS; SUBCUTANEOUS AS NEEDED
Status: DISCONTINUED | OUTPATIENT
Start: 2020-04-29 | End: 2020-04-30 | Stop reason: HOSPADM

## 2020-04-29 RX ORDER — SODIUM CHLORIDE 0.9 % (FLUSH) 0.9 %
5-40 SYRINGE (ML) INJECTION AS NEEDED
Status: CANCELLED | OUTPATIENT
Start: 2020-04-29

## 2020-04-29 RX ORDER — SODIUM CHLORIDE 0.9 % (FLUSH) 0.9 %
5-40 SYRINGE (ML) INJECTION EVERY 8 HOURS
Status: DISCONTINUED | OUTPATIENT
Start: 2020-04-29 | End: 2020-04-29 | Stop reason: HOSPADM

## 2020-04-29 RX ORDER — SODIUM CHLORIDE 0.9 % (FLUSH) 0.9 %
5-40 SYRINGE (ML) INJECTION AS NEEDED
Status: DISCONTINUED | OUTPATIENT
Start: 2020-04-29 | End: 2020-04-29 | Stop reason: HOSPADM

## 2020-04-29 RX ORDER — DIAZEPAM 5 MG/1
5 TABLET ORAL
Status: DISCONTINUED | OUTPATIENT
Start: 2020-04-29 | End: 2020-04-30 | Stop reason: HOSPADM

## 2020-04-29 RX ORDER — PROPOFOL 10 MG/ML
0-50 VIAL (ML) INTRAVENOUS
Status: DISCONTINUED | OUTPATIENT
Start: 2020-04-29 | End: 2020-04-29

## 2020-04-29 RX ORDER — FAMOTIDINE 20 MG/1
20 TABLET, FILM COATED ORAL 2 TIMES DAILY
Status: DISCONTINUED | OUTPATIENT
Start: 2020-04-29 | End: 2020-04-30 | Stop reason: HOSPADM

## 2020-04-29 RX ORDER — SODIUM CHLORIDE 0.9 % (FLUSH) 0.9 %
5-40 SYRINGE (ML) INJECTION AS NEEDED
Status: DISCONTINUED | OUTPATIENT
Start: 2020-04-29 | End: 2020-04-30 | Stop reason: HOSPADM

## 2020-04-29 RX ADMIN — GABAPENTIN 100 MG: 100 CAPSULE ORAL at 17:52

## 2020-04-29 RX ADMIN — MIDAZOLAM HYDROCHLORIDE 2 MG: 1 INJECTION, SOLUTION INTRAMUSCULAR; INTRAVENOUS at 01:08

## 2020-04-29 RX ADMIN — FENTANYL CITRATE 25 MCG: 50 INJECTION INTRAMUSCULAR; INTRAVENOUS at 00:56

## 2020-04-29 RX ADMIN — FAMOTIDINE 20 MG: 20 TABLET, FILM COATED ORAL at 09:32

## 2020-04-29 RX ADMIN — Medication 10 ML: at 13:58

## 2020-04-29 RX ADMIN — HYDROMORPHONE HYDROCHLORIDE 1 MG: 1 INJECTION, SOLUTION INTRAMUSCULAR; INTRAVENOUS; SUBCUTANEOUS at 05:35

## 2020-04-29 RX ADMIN — HYDROMORPHONE HYDROCHLORIDE 1 MG: 1 INJECTION, SOLUTION INTRAMUSCULAR; INTRAVENOUS; SUBCUTANEOUS at 02:32

## 2020-04-29 RX ADMIN — ACETAMINOPHEN 1000 MG: 500 TABLET ORAL at 17:53

## 2020-04-29 RX ADMIN — POLYETHYLENE GLYCOL 3350 17 G: 17 POWDER, FOR SOLUTION ORAL at 09:32

## 2020-04-29 RX ADMIN — Medication 1 AMPULE: at 08:55

## 2020-04-29 RX ADMIN — SODIUM CHLORIDE 125 ML/HR: 900 INJECTION, SOLUTION INTRAVENOUS at 00:48

## 2020-04-29 RX ADMIN — FAMOTIDINE 20 MG: 20 TABLET, FILM COATED ORAL at 17:53

## 2020-04-29 RX ADMIN — Medication 160 MCG: at 00:06

## 2020-04-29 RX ADMIN — DEXAMETHASONE SODIUM PHOSPHATE 4 MG: 4 INJECTION, SOLUTION INTRAMUSCULAR; INTRAVENOUS at 10:42

## 2020-04-29 RX ADMIN — OXYCODONE 5 MG: 5 TABLET ORAL at 09:41

## 2020-04-29 RX ADMIN — DOCUSATE SODIUM - SENNOSIDES 1 TABLET: 50; 8.6 TABLET, FILM COATED ORAL at 09:32

## 2020-04-29 RX ADMIN — GABAPENTIN 100 MG: 100 CAPSULE ORAL at 21:14

## 2020-04-29 RX ADMIN — DEXAMETHASONE SODIUM PHOSPHATE 4 MG: 4 INJECTION, SOLUTION INTRAMUSCULAR; INTRAVENOUS at 13:57

## 2020-04-29 RX ADMIN — PROPOFOL 20 MCG/KG/MIN: 10 INJECTION, EMULSION INTRAVENOUS at 05:22

## 2020-04-29 RX ADMIN — FENTANYL CITRATE 25 MCG: 50 INJECTION INTRAMUSCULAR; INTRAVENOUS at 01:49

## 2020-04-29 RX ADMIN — OXYCODONE 5 MG: 5 TABLET ORAL at 17:52

## 2020-04-29 RX ADMIN — DOCUSATE SODIUM - SENNOSIDES 1 TABLET: 50; 8.6 TABLET, FILM COATED ORAL at 17:53

## 2020-04-29 RX ADMIN — WATER 2 G: 1 INJECTION INTRAMUSCULAR; INTRAVENOUS; SUBCUTANEOUS at 17:47

## 2020-04-29 RX ADMIN — WATER 2 G: 1 INJECTION INTRAMUSCULAR; INTRAVENOUS; SUBCUTANEOUS at 07:49

## 2020-04-29 RX ADMIN — Medication 10 ML: at 21:15

## 2020-04-29 RX ADMIN — PROPOFOL 25 MCG/KG/MIN: 10 INJECTION, EMULSION INTRAVENOUS at 00:23

## 2020-04-29 RX ADMIN — ACETAMINOPHEN 1000 MG: 500 TABLET ORAL at 23:35

## 2020-04-29 RX ADMIN — Medication 10 ML: at 05:19

## 2020-04-29 RX ADMIN — ROCURONIUM BROMIDE 15 MG: 10 INJECTION INTRAVENOUS at 00:14

## 2020-04-29 RX ADMIN — ACETAMINOPHEN 1000 MG: 500 TABLET ORAL at 10:42

## 2020-04-29 RX ADMIN — GABAPENTIN 100 MG: 100 CAPSULE ORAL at 09:32

## 2020-04-29 RX ADMIN — DEXAMETHASONE SODIUM PHOSPHATE 4 MG: 4 INJECTION, SOLUTION INTRAMUSCULAR; INTRAVENOUS at 21:14

## 2020-04-29 RX ADMIN — MIDAZOLAM 2 MG: 1 INJECTION INTRAMUSCULAR; INTRAVENOUS at 01:08

## 2020-04-29 RX ADMIN — Medication 1 AMPULE: at 03:20

## 2020-04-29 NOTE — PROGRESS NOTES
propReport received from [facility]  4710: Surgical PA at bedside. OK with extubation per pulm team, OK with steroids if needed. Will leave VENKATESH drain forn now. I patient has not put out greater than 50 mL by 1200 will pull drain. Will get PT/OT to work with patient today. Patient may wear soft collar while not moving. When patient starts to become more mobile will place C collar on patient. 26: Patient placed on SBT trial. Spoke with  and gave update on plan for the day. 0825 :Dr. Karen Griffin at bedside. Orders to extuibate patient, Md will put in precedex orders for PRN agitation. 1983: Patient extubated to 3 LNC. Paged Pt/OT to let them know that patient is now extubated and ok to see today. 1148: Soft collar placed on patient per request.   5359: Patient passed swallow eval. Patient states she is \" very hungary\". GI light diet placed per protocol. 1020: Patient tolerated GI diet well. Requested diet with no milk ( only almond if possible and no concentrated sweets). Diet changed to regular with preferences added. 1138: Patient back from X-Ray. Patient placed in chair. Sat 100 RA.   1222: Patient UO greater than 100 per hour for last four hours, IV fluids stopped. Patient put out a total of 60 from VENKATESH drain since this AM, will leave VENKATESH drain in per discussion. 1352: Spoke with Pulm, patient ok to transfer out of ICU, will remove machado catheter. 1410: Machado catheter pulled. Patient instructed to call if needed to void. 1438: Called report to Oswaldo, Will transport patient shortly.

## 2020-04-29 NOTE — PERIOP NOTES
Handoff Report from Operating Room to PACU    Report received from Juana Quezada CRNA and Sukhi Cantu RN regarding Clari Lord. Surgeon(s):  Parul Rock MD  And Procedure(s) (LRB):  RE-ENTRY SPINE ANTERIOR CERVICAL SITE; DRAINAGE OF HEMATOMA (N/A)  confirmed   with allergies, drains and dressings discussed. Anesthesia type, drugs, patient history, complications, estimated blood loss, vital signs, intake and output, and last pain medication, lines, reversal medications and temperature were reviewed. Patient remains intubated. RT at bedside upon arrival to PACU.

## 2020-04-29 NOTE — PROGRESS NOTES
Pt seen and examined in PACU. Neurovascular intact  Neck swollen, likely hematoma. No respiratory distress. Drain functioning properly. BP spiked to 180s/110s, likely partially responsible for hematoma. BP now improving to the 150s/80s  Will cont to monitor in PACU for another hour and transfer to PCU with pulse monitoring afterwards.

## 2020-04-29 NOTE — BRIEF OP NOTE
Brief Postoperative Note    Patient: Gregoria Beck  YOB: 1965  MRN: [id number]    Date of Procedure: 4/28/2020     Pre-Op Diagnosis: hematoma status  post ANTERIOR CERVICAL FUSION    Post-Op Diagnosis: Same as preoperative diagnosis. Procedure(s):  RE-ENTRY SPINE ANTERIOR CERVICAL SITE; DRAINAGE OF HEMATOMA    Surgeon(s):  Jose Clemons MD    Surgical Assistant: None    Anesthesia: General     Estimated Blood Loss (mL): less than 50     Complications: None    Specimens: * No specimens in log *     Implants: * No implants in log *    Drains:   Mike-Perez Drain 04/28/20 Left; Anterior Neck (Active)   Site Assessment Clean, dry, & intact 4/28/2020 10:29 PM   Dressing Status Clean, dry, & intact 4/28/2020 10:29 PM   Status Patent; Charged;Draining 4/28/2020 10:29 PM   Drainage Color Sanguinous 4/28/2020 10:29 PM       Findings: Hematoma    Electronically Signed by Allyssa Salcedo MD on 4/29/2020 at 12:08 AM

## 2020-04-29 NOTE — OP NOTES
[facility]  OPERATIVE REPORT    Name:  Bonita Bashir  MR#:  [id number]  :  1965  ACCOUNT #:  [de-identified]  DATE OF SERVICE:  2020      PREOPERATIVE DIAGNOSES:  1. Postop hematoma. 2.  Respiratory distress. POSTOPERATIVE DIAGNOSES:  1. Postop hematoma. 2.  Respiratory distress. PROCEDURE PERFORMED:  Evacuation of anterior cervical postoperative hematoma. SURGEON:  Anamika Hubbard MD    FIRST ASSISTANTWinferaldo Maier. ANESTHESIA:  General.    COMPLICATIONS:  None. SPECIMENS REMOVED:  None. IMPLANTS:  None. ESTIMATED BLOOD LOSS:  Less than 50 mL. DRAINS:  x1. INDICATIONS FOR PROCEDURE:  The patient is a 59-year-old lady who underwent a three-level anterior cervical diskectomy and fusion. In PACU, she developed swelling of her neck and shortly thereafter respiratory distress. Therefore was brought back into operating room for drainage of anterior cervical hematoma. Postoperatively, her blood pressure had spiked to over 180/one teens, and this I believe could be the possible cause for the hematoma. NARRATIVE OF THE PROCEDURE:  After informed consent was obtained and the operative site was properly marked, the patient was moved back to the operating room, she had undergone an endotracheal anesthesia. We prepped and draped in the usual manner and proceeded then to open the previous incision. As I opened the previous incision, we found some hematoma possibly about a golf ball size and that was removed from deep to the platysma. Once the hematoma was evacuated, we irrigated the wound, we found some minor wall bleeders that were oozing, but no branden large bleeder, no arterial bleeding, no copious amount of fluid. We proceeded to irrigate the wound with Irrisept and once the irrigation was over, there was no further areas of wall bleeding visualized, the wound was dry.   We proceeded then to close the platysma with #1 Vicryl, the subcu with 2-0 Vicryl, the skin with 3-0 running Monocryl. The patient was then transferred to PACU. We are going to keep her intubated overnight and attempt extubation once again in the morning.         MD MARI [id number]  D:  04/29/2020 0:14  T:  04/29/2020 3:04  JOB #:  [id number]  CC:  [facility]

## 2020-04-29 NOTE — PROGRESS NOTES
Orders received and chart reviewed. Noted pt had ACDF and needed re-entry due to hematoma/irrigation early this morning. Pt remains intubated and sedated. Will defer eval at this time but continue to follow.

## 2020-04-29 NOTE — CONSULTS
Hematology Oncology Consultation      Reason for consult: postop bleeding, easy bruising by history    Admitting Diagnosis: Cervical stenosis of spinal canal [M48.02]    Impression: Postop bleeding, easy bruising history - The timing of bleeding events may offer clues as to the kind of hemostatic defect. Intraoperative and immediate postoperative bleeding suggests a defect in primary hemostasis, such as abnormalities in platelet number, adhesion or aggregation. The hereditary disease states that are in the differential diagnosis include von Willebrand's disease, GLanzmann thromboasthenia, platelet storage pool dissease, Laverna Cheng platelet syndrome, Wiskott Rain syndrome, and May Hegglin anomaly. Iatrogenic causes include posttransufsion purpura, drug induced immunologic  thrombocytopenia (seen with quinine, heparin, sulfonamide antibitoics) and drug induced qualitatitve platelet disorders (seen with aspirin, NSAIDs, ticlopidine, abciximab, mithramycin). Acquired disease states included in the differential diagnosis include autoimmune thrombocytopenia purpura, DIC, hypersplenism, aplastic anemia, uremia and mechanical plt destruction due to turbulent circulation (cardiac bypass, severe aortic stenosis). Delyed postoperative bleeding at the surgical site is more often seen with coagulation factor deficiencies, qualitative or quantitative disorders of firinogen (afibrinogenemia, hypo or dysfibrinogenemia), or vascular abnormalities related to defects in collagen structure. The coagulation factor abnormalities include hemophila A and B, deficiencies in II, V, VII or X, acquired inhibitors VIII and V most common), and factor XIII deficiency. Factor XI deficiency is a contact factor deficiency assocated with delayed bleeding. Hyperfibrinolysis, Ehler-Danlos syndrome, Osler Fabian Rendu syndrome, and scurvy (vitamin C deficiency) are also in the differential diagnosis.      Her stat PT and PTT are normal which is reassuring and rules out a number of entities. She has no family history of bleeding or bruising which would rule out a number of the congenital platelet disorders. She did well with her tonsillectomy at age 24 and her C- section with the birth of her son, which likely rules out many of those entities as well. Given her life long history of easy bruising, I have asked for [facility] Disease panel. She has been on a number of supplements and it is possible that one or more of these might have had an antiplatelet effect. I have given her my card and asked her to call in a couple of weeks to review the results. If it comes back while she is still in house, I will review it with her while here. Hopefully she will maintain hemostasis going forward. Thanks for the consult. It is very much appreciated! Lisa Navas MD FACP    Discussion: Sharif Omer is a  47y.o. year old seen in consultation at the request of Dr. Mirella Parra for postop bleeding requiring re-operation and history of easy bruising. She reports that for most of her life she has had bruises with minimal trauma. She has had a tonsillectomy and C section without any bleeding issues. She tolerates dental procedures without difficulty. She has not had heavy menses but has been on OCPs or used the Mirena for years. She had complaints of bilateral hand numbness and clumsiness  present 2 months.  The pain has been present nearly constantly with no exacerbating or relieving factors. She tried physical therapy and oral steroids.  The pain is rated 10 out of 10 on the VAS. MRI of C spine on 4/22/20 reported  The paraspinal soft tissues are within normal limits. C2-C3: No herniation or stenosis. C3-C4: No herniation or stenosis. C4-C5: There is a large central herniation at C4-C5 with severe compression of the cervical cord with increased signal intensity in the cervical cord at this level consistent with edema. The neural foramina appear patent.  C5-C6: There is disc space narrowing with posterior osteophyte/disc complex more prominent to the left resulting in moderately severe central stenosis and narrowing of the right neural foramen. C6-C7: There is disc space narrowing with a posterior osteophyte resulting in moderate central stenosis and mild narrowing of the neural foramina right greater than left. .  C7-T1: No herniation or stenosis. IMPRESSION: 1. There is a large herniation at C4-C5 compressing the cervical cord with increased signal intensity within the cervical cord at this level consistent with edema. 2. At C5-C6, there is disc space narrowing with posterior osteophyte/disc complex resulting in moderately severe central stenosis right side greater than left and moderate narrowing of the right neural foramen. 3. At C6-C7, there is disc space narrowing with a mild osteophyte/disc complex posteriorly resulting in moderate central stenosis and mild narrowing of neural foramina right greater than left. She underwent C4-7 ANTERIOR CERVICAL DISCECTOMY WITH FUSION but in the PACU developed signs of postop swelling and bleeding and was returned to the OR. She then underwent evacuation of anterior cervical postoperative hematoma on 4/28/20. The current consult was prompted by this event coupled with her history of easy bruising. She reports that others have commented that she is a \"free bleeder\" with her being most aware of this when she cuts herself (incident with pineapple can).      Labs:    Recent Results (from the past 24 hour(s))   VON WILLEBRAND PANEL    Collection Time: 04/29/20 12:50 PM   Result Value Ref Range    Factor VIII Activity PENDING %    von Willebrand Factor (vWF) Ag PENDING %    vWF Activity PENDING %   PROTHROMBIN TIME + INR    Collection Time: 04/29/20  1:06 PM   Result Value Ref Range    INR 1.0 0.9 - 1.1      Prothrombin time 10.7 9.0 - 11.1 sec   PTT    Collection Time: 04/29/20  1:06 PM   Result Value Ref Range    aPTT 27.6 22.1 - 32.0 sec aPTT, therapeutic range     58.0 - 77.0 SECS       History:  Past Medical History:   Diagnosis Date    Anxiety     Hypertension     Ill-defined condition 2020    kidney stones      Past Surgical History:   Procedure Laterality Date    HX GYN  1988    c/section under [place]    HX HEENT  1986    tonsilectomy      Prior to Admission medications    Medication Sig Start Date End Date Taking? Authorizing Provider   norethindrone (MICRONOR) 0.35 mg tab Take  by mouth daily. Yes Provider, Historical   diazepam (VALIUM) 5 mg tablet Take 1 Tab by mouth every eight (8) hours as needed for Anxiety. 3/13/14  Yes Christopher Diez, DO     Allergies   Allergen Reactions    Penicillin G Hives     Old allergy - does not recall if keflex      Social History     Tobacco Use    Smoking status: Light Tobacco Smoker     Packs/day: 0.25     Years: 25.00     Pack years: 6.25    Smokeless tobacco: Never Used   Substance Use Topics    Alcohol use: Not Currently      Family History   Problem Relation Age of Onset    Hypertension Mother     Heart Disease Father     Hypertension Sister     Hypertension Brother         Current Medications:  Current Facility-Administered Medications   Medication Dose Route Frequency    diazePAM (VALIUM) tablet 5 mg  5 mg Oral Q8H PRN    . PHARMACY TO SUBSTITUTE PER PROTOCOL (Reordered from: norethindrone (MICRONOR) 0.35 mg tab)    Per Protocol    sodium chloride (NS) flush 5-40 mL  5-40 mL IntraVENous Q8H    sodium chloride (NS) flush 5-40 mL  5-40 mL IntraVENous PRN    acetaminophen (TYLENOL) tablet 1,000 mg  1,000 mg Oral Q6H    HYDROmorphone (PF) (DILAUDID) injection 1 mg  1 mg IntraVENous Q3H PRN    ceFAZolin (ANCEF) 2 g in sterile water (preservative free) 20 mL IV syringe  2 g IntraVENous Q8H    naloxone (NARCAN) injection 0.4 mg  0.4 mg IntraVENous PRN    ondansetron (ZOFRAN) injection 4 mg  4 mg IntraVENous Q4H PRN    famotidine (PEPCID) tablet 20 mg  20 mg Oral BID    hydrOXYzine HCL (ATARAX) tablet 10 mg  10 mg Oral Q8H PRN    senna-docusate (PERICOLACE) 8.6-50 mg per tablet 1 Tab  1 Tab Oral BID    polyethylene glycol (MIRALAX) packet 17 g  17 g Oral DAILY    [START ON 4/30/2020] bisacodyL (DULCOLAX) suppository 10 mg  10 mg Rectal DAILY PRN    phenol throat spray (CHLORASEPTIC) 1 Spray  1 Spray Oral PRN    benzocaine-menthoL (CEPACOL) lozenge 1 Lozenge  1 Lozenge Oral PRN    cyclobenzaprine (FLEXERIL) tablet 10 mg  10 mg Oral BID PRN    gabapentin (NEURONTIN) capsule 100 mg  100 mg Oral TID    alcohol 62% (NOZIN) nasal  1 Ampule  1 Ampule Topical Q12H    propofol (DIPRIVAN) 10 mg/mL infusion  0-50 mcg/kg/min IntraVENous TITRATE    dexamethasone (DECADRON) 4 mg/mL injection 4 mg  4 mg IntraVENous Q8H    0.9% sodium chloride infusion  125 mL/hr IntraVENous CONTINUOUS    oxyCODONE IR (ROXICODONE) tablet 5 mg  5 mg Oral Q3H PRN    oxyCODONE IR (ROXICODONE) tablet 10 mg  10 mg Oral Q3H PRN    prochlorperazine (COMPAZINE) with saline injection 5 mg  5 mg IntraVENous Q6H PRN    diazePAM (VALIUM) tablet 5 mg  5 mg Oral Q6H PRN         ROS negative for 11 organ systems except as noted above. Physical Exam:  Constitutional Alert, cooperative, oriented. Mood and affect appropriate. Appears close to chronological age. Well nourished. Well developed. Head Normocephalic; no scars   Eyes Conjunctivae and sclerae are clear and without icterus. Pupils are reactive and equal.   ENMT Sinuses are nontender. No oral exudates, ulcers, masses, thrush or mucositis. Oropharynx clear. Tongue normal.   Neck In hard collar. Ecchymoses obvious. Hematologic/Lymphatic  No tender or palpable lymph nodes noted. But would not be able to assess in neck. Respiratory Lungs are clear to auscultation without rhonchi or wheezing. Cardiovascular Regular rate and rhythm of heart without murmurs, gallops or rubs. Chest / Line Site Chest is symmetric with no chest wall deformities.    Abdomen Non-tender, non-distended, no masses, ascites or hepatosplenomegaly. Good bowel sounds. Musculoskeletal No tenderness or swelling, normal range of motion. Extremities No visible deformities, no cyanosis, clubbing or edema. Skin No rashes, scars, or lesions suggestive of malignancy. No petechiae, purpura, or ecchymoses. No excoriations. Neurologic No sensory or motor deficits noted but not tested. Psychiatric Alert. Coherent speech. Verbalizes understanding of our discussions today.      Ruben Oneill MD [facility] office  [address] [place]  Phone [phone]  Fax [phone]

## 2020-04-29 NOTE — ANESTHESIA PREPROCEDURE EVALUATION
Relevant Problems   No relevant active problems       Anesthetic History   No history of anesthetic complications            Review of Systems / Medical History  Patient summary reviewed, nursing notes reviewed and pertinent labs reviewed    Pulmonary  Within defined limits                 Neuro/Psych   Within defined limits           Cardiovascular    Hypertension              Exercise tolerance: >4 METS     GI/Hepatic/Renal  Within defined limits              Endo/Other  Within defined limits           Other Findings   Comments: Cervicalgia     hcg -           Physical Exam    Airway  Mallampati: IV  TM Distance: 4 - 6 cm  Neck ROM: decreased range of motion   Mouth opening: Diminished (comment)    Comments: Pt with mod to severe post op swelling/bleeding that severely distorted her airway, neck/throat. Cardiovascular  Regular rate and rhythm,  S1 and S2 normal,  no murmur, click, rub, or gallop             Dental  No notable dental hx       Pulmonary  Breath sounds clear to auscultation               Abdominal  GI exam deferred       Other Findings            Anesthetic Plan    ASA: 2, emergent  Anesthesia type: general    Monitoring Plan: BIS      Induction: Intravenous  Anesthetic plan and risks discussed with: Patient      Pt emergently re-intubated in PACU for post op swelling/bleeding from ACDF that had just occurred.

## 2020-04-29 NOTE — ANESTHESIA POSTPROCEDURE EVALUATION
Procedure(s):  RE-ENTRY SPINE ANTERIOR CERVICAL SITE; DRAINAGE OF HEMATOMA.    general    Anesthesia Post Evaluation        Patient location during evaluation: PACU  Note status: Adequate. Level of consciousness: responsive to verbal stimuli and sleepy but conscious  Pain management: satisfactory to patient  Airway patency: patent  Anesthetic complications: no  Cardiovascular status: acceptable  Respiratory status: acceptable and ETT  Hydration status: acceptable  Comments: +Post-Anesthesia Evaluation and Assessment    Patient: Derek Clemente MRN: [id number]  SSN: [id number]   YOB: 1965  Age: 47 y.o. Sex: female      Cardiovascular Function/Vital Signs    /76 (BP 1 Location: Left arm, BP Patient Position: At rest)   Pulse 83   Temp 36.7 °C (98.1 °F)   Resp 12   Ht 5' 1.5\" (1.562 m)   Wt 66.1 kg (145 lb 11.6 oz)   SpO2 100%   BMI 27.09 kg/m²     Patient is status post Procedure(s):  RE-ENTRY SPINE ANTERIOR CERVICAL SITE; DRAINAGE OF HEMATOMA. Nausea/Vomiting: Controlled. Postoperative hydration reviewed and adequate. Pain:  Pain Scale 1: FLACC (04/29/20 0115)  Pain Intensity 1: 0 (04/28/20 2228)   Managed. Neurological Status:   Neuro (WDL): Exceptions to WDL (04/29/20 0045)   At baseline. Mental Status and Level of Consciousness: Arousable. Pulmonary Status:   O2 Device: Endotracheal tube (04/29/20 0115)   Adequate oxygenation and airway patent. Complications related to anesthesia: None    Post-anesthesia assessment completed. Pt emergently intubated in PACU after first surg due to neck bleeding/swelling, and now left intubated due to continued swelling, no cuff leak on ETT and very difficult airway given the circumstances.     Signed By: Rosalina Cheadle, MD    4/29/2020  Post anesthesia nausea and vomiting:  controlled      Vitals Value Taken Time   /76 4/29/2020  1:15 AM   Temp 36.7 °C (98.1 °F) 4/29/2020 12:39 AM   Pulse 83 4/29/2020  1:19 AM   Resp 12 4/29/2020  1:19 AM   SpO2 100 % 4/29/2020  1:19 AM   Vitals shown include unvalidated device data.

## 2020-04-29 NOTE — PROGRESS NOTES
04/29/20 0500   ABCDEF Bundle   SBT Safety Screen Passed No  (Patient arrived to unit at 0200 from surgery)

## 2020-04-29 NOTE — OP NOTES
[address]  OPERATIVE REPORT    Name:  Maria Teresa Jones  MR#:  [id number]  :  1965  ACCOUNT #:  [de-identified]  DATE OF SERVICE:  2020      PREOPERATIVE DIAGNOSES:  1. Cervical disk herniation at C4-5 with myelopathy and myelomalacia. 2.  Cervical spinal stenosis C5 to C7.  3.  Cervical myelopathy. 4.  Cervical radiculopathy. POSTOPERATIVE DIAGNOSES:  1. Cervical disk herniation at C4-5 with myelopathy and myelomalacia. 2.  Cervical spinal stenosis C5 to C7.  3.  Cervical myelopathy. 4.  Cervical radiculopathy. PROCEDURE PERFORMED:  1. C4 through C7 anterior cervical diskectomy and fusion x3.  2.  C4 through C7 anterior instrumentation. 3.  Use of operative microscope for fusion. 4.  Use of allograft bone for spine fusion. SURGEON:  Bridget Dumont MD    FIRST ASSISTANTFondnavdeep Jean    ANESTHESIA:  General.    COMPLICATIONS:  None. SPECIMENS REMOVED:  None. IMPLANTS:  Nanovis FortiCore interbody biomechanical device and Nanovis FortiBridge anterior cervical plate. ESTIMATED BLOOD LOSS:  About 50 mL. DRAINS:  x1. INDICATIONS FOR PROCEDURE:  The patient is a 51-year-old lady with cervical disk herniation, cervicalgia and cervical myelopathy with myelomalacia. At this point would like to proceed with surgical intervention. I have given her warnings about possible complications including, but not limited to pain, scar, bleeding, infection, nonunion, damage to surrounding structures, death, paralysis, blindness, stroke. She understands and wants to proceed. NARRATIVE OF THE PROCEDURE:  After informed consent was obtained and the operative site was properly marked, the patient was moved back to operating room and underwent general endotracheal anesthesia. She was positioned supine on the operating room table using Mike table flat top. Her arms were well-padded and tucked to the side. The knees were gently bent with pillows.   Fluoroscopy was used to mariaelena the level of the incision. We then proceeded to prep and drape in the usual manner. Time-out was obtained verifying that this was the correct patient, the correct surgery, the correct site as well as that she had received IV antibiotics within 30 minutes of the incision. Then proceeded to perform a standard anterior approach to cervical spine exposing the area between C4 and C7. Once the spine was exposed, I was able to elevate the longus colli muscles on both right and left sides protecting the sympathetic chain and exposing the uncinate processes bilaterally. I was able to use the fluoroscopy to verify that we were in the correct level and then place [place] pins at C5 and C4 and apply gentle axial distraction. The operative microscope was brought into the field and kept in place for the remainder of the procedure. I then proceeded to perform a box annulotomy with a pituitary and remove the disk with a pituitary and a curette for a full diskectomy. Once we reached the PLL, I used a Midas Danielito to make endplates coplanar and I proceeded to use a Kerrison #1 followed by Kerrison #2 to remove the PLL, find the herniated disk material and remove it in its entirety. Once that area was fully decompressed, I proceeded to size for a spacer and as the spacer was packed with allograft bone, it was inserted between C4 and C5 for the anterior fusion at that level. Once that was completed, I moved my [place] pins down to the C5-6 level and repeated the procedure in the exact same manner as well as repeating it also afterwards at the C6-7 level. Both levels, we proceeded with diskectomy, the decompression, the insertion of interbody biomechanical device and anterior fusion in the same manner. Once all three levels were completed, I proceeded to measure for a plate and drilled four screws bilaterally at C4, C5, C6, C7. The screws were final tightened and locked into plate.   Once that was completed, I proceeded to irrigate the wound with normal saline, placed a deep drain, closed the platysma with 2-0 Vicryl, the subcu with 3-0 Vicryl, the skin with 3-0 running Monocryl and Dermabond. Dressing was applied. The patient was then awakened, transferred to PACU in stable condition. POSTOPERATIVE PLAN:  The patient is going to remain here at least overnight. We are going to give her SCDs and MARCELO hoses for DVT prophylaxis and Ancef for infection prophylaxis.         MD ALECIA [email or web address]  D:  04/28/2020 22:14  T:  04/28/2020 23:34  JOB #:  [id number]  CC:  [facility]

## 2020-04-29 NOTE — PROGRESS NOTES
Physical therapy:    Orders received and chart reviewed. Noted pt had ACDF and needed re-entry due to hematoma/irrigation early this morning. Pt remains intubated and sedated. Will defer eval at this time but continue to follow.

## 2020-04-29 NOTE — PROGRESS NOTES
TRANSFER - IN REPORT:    Verbal report received from Chris RN (name) on Radha Stoner  being received from CCU (unit) for routine progression of care      Report consisted of patients Situation, Background, Assessment and   Recommendations(SBAR). Information from the following report(s) SBAR, Kardex, Procedure Summary, Intake/Output, MAR, Recent Results, Med Rec Status and Cardiac Rhythm NSR was reviewed with the receiving nurse. Opportunity for questions and clarification was provided. Assessment completed upon patients arrival to unit and care assumed.

## 2020-04-29 NOTE — ROUTINE PROCESS
TRANSFER - IN REPORT: 
 
Verbal report received from MARIO Howell RN(name) on Ayleen Thurman  being received from OR(unit) for routine post - op Report consisted of patients Situation, Background, Assessment and  
Recommendations(SBAR). Information from the following report(s) OR Summary was reviewed with the receiving nurse. Opportunity for questions and clarification was provided. Assessment completed upon patients arrival to unit and care assumed.

## 2020-04-29 NOTE — PROGRESS NOTES
ORTHO POST OP SPINE PROGRESS NOTE    2020  Admit Date: 2020  Admit Diagnosis: Cervical stenosis of spinal canal [M48.02]  Procedure: Procedure(s):  RE-ENTRY SPINE ANTERIOR CERVICAL SITE; DRAINAGE OF HEMATOMA  Post Op day: 1 Day Post-Op    Subjective:     Adriane Velasquez is a patient who Was seen in the CCU this morning. C4 through C7 ACDF done last night although brought back to the operating room from PACU for hematoma. She was brought to CCU following and remains intubated this morning at the time of my visit. She is able to signal that her arms are feeling better. at the time of this note she has since been transferred to the orthopedic 4 and extubated. Review of Systems: Pertinent items are noted in HPI. Objective:     PT/OT:   Distance Ambulated:           Time Ambulated (min):        Ambulation Response: Activity Response: Fairly tolerated  Assistive Device:              Assistive Device: Fall prevention device    Vital Signs:    Blood pressure 148/65, pulse 75, temperature 97.5 °F (36.4 °C), resp. rate 20, height 5' 1.5\" (1.562 m), weight 66.2 kg (145 lb 15.1 oz), last menstrual period 2020, SpO2 100 %. Temp (24hrs), Av.3 °F (36.8 °C), Min:97.5 °F (36.4 °C), Max:99.2 °F (37.3 °C)       07 -  190  In: 1350.1 [P.O.:840; I.V.:510.1]  Out: 0513 [Urine:1310; Drains:80]  1901 -  0700  In: 3129.8 [I.V.:3129.8]  Out: 740 [Urine:550; Drains:40]    LAB:    No results for input(s): HGB, HGBEXT, WBC, PLT, PLTEXT, HGBEXT, PLTEXT in the last 72 hours. Wound/Drain Assessment:  Drain:      Dressing:     Physical Exam:  Incision clean, dry, and intact  Able to move arms and legs without difficulty    Assessment:      Patient Active Problem List   Diagnosis Code    Cervical stenosis of spinal canal M48.02       Plan:     Continue PT/OT/Rehab  Discontinue:  Consult: PT  and OT    Discharge To:  Home likely pending progress   Appreciate assistance by CCU staff and hematology   okay to go without cervical collar but  Would limit movement of her neck.   Consider soft collar   will get her to work with physical therapy and occupational therapy and work on discharge planning from there  DC drain

## 2020-04-29 NOTE — PERIOP NOTES
TRANSFER - OUT REPORT:    Verbal report given to Holger Juarez RN (name) on Mardel Wheeling  being transferred to CCU (unit) for routine post - op       Report consisted of patients Situation, Background, Assessment and   Recommendations(SBAR). Information from the following report(s) SBAR, Kardex, OR Summary, Procedure Summary, Intake/Output, MAR and Cardiac Rhythm NSR was reviewed with the receiving nurse. Lines:   Peripheral IV 04/28/20 Right Wrist (Active)   Site Assessment Clean, dry, & intact 4/29/2020 12:35 AM   Phlebitis Assessment 0 4/29/2020 12:35 AM   Infiltration Assessment 0 4/29/2020 12:35 AM   Dressing Status Clean, dry, & intact 4/29/2020 12:35 AM   Dressing Type Tape;Transparent 4/29/2020 12:35 AM   Hub Color/Line Status Pink;Capped 4/29/2020 12:35 AM       Peripheral IV Left Arm (Active)   Site Assessment Clean, dry, & intact 4/29/2020 12:35 AM   Phlebitis Assessment 0 4/29/2020 12:35 AM   Infiltration Assessment 0 4/29/2020 12:35 AM   Dressing Status Clean, dry, & intact 4/29/2020 12:35 AM   Dressing Type Tape;Transparent 4/29/2020 12:35 AM   Hub Color/Line Status Pink; Infusing 4/29/2020 12:35 AM        Opportunity for questions and clarification was provided. Patient transported with:   Monitor  O2 @ 10 liters  Registered Nurse     Patient remains intubated and on vent for transfer.  RT present for transfer

## 2020-04-29 NOTE — PERIOP NOTES
2232 Dr. Mae Martini at bedside assessing neck swelling, ice and PCU bed ordered. 2258 Pt. Swelling is getting worse, C/O SOB and tightness in Throat, Voice getting Squeaky. Dr. Darleen Cabrera at bedside. 18 Dr. Mae Martini notified, and said to get things ready for reentry. 2304 Pt. C/O not being able to breath. 2310 Pt. Getting restless and >BP, Anesthesia at bedside re-intubating patient. 2315 Pt. Off unit to OR.

## 2020-04-29 NOTE — PROGRESS NOTES
CASI PLAN:    1) Home w/  - HH per OT/PT recommendations  2) Follow-up appts - patient able to do virtual appointments    Reason for Admission:   Cervical fusion                   RUR Score:      RUR 9%               Plan for utilizing home health:      CM received consult for home health  - OT/PT.  states \"we don't need that\". CM awaiting recommendations from OT/PT consult    PCP: First and Last name:  Dr. Kelly Chatterjee   Name of Practice:    Are you a current patient: Yes/No: Yes   Approximate date of last visit:  Approximately one month ago                    Current Advanced Directive/Advance Care Plan: No current advanced directory - patient just off of vent this morning - does not wish to discuss at present time. Transition of Care Plan:      CM assessment via telephone due to current restrictions related to Covid-19. All demographic information verified and baseline care needs assessed. Patient admitted to hospital for anterior cervical disc fusion - had to re-enter due to hematoma - ended up on vent. Patient off of vent this morning. Patient lives with  in one story home w/ 3 steps to enter. Patient is usually independent at baseline, including driving.  home w/ patient 24/7 as he is currently working from home. Patient's  bought a shower chair in anticipation of needs following surgery. [place] of choice offered for home health -  declines needs for home health. CM will await recommendations from OT/PT evals. Care Management Interventions  PCP Verified by CM: Yes(Dr. Kelly Chatterjee)  Last Visit to PCP: 04/01/20  Transition of Care Consult (CM Consult):  Other(Initial CM Assessment)  MyChart Signup: No  Discharge Durable Medical Equipment: No(No cuurent DME needs -  purchased a shower chair)  Physical Therapy Consult: Yes  Occupational Therapy Consult: Yes  Speech Therapy Consult: No  Current Support Network: Lives with Spouse, Own Home  The Patient and/or Patient Representative was Provided with a Choice of Provider and Agrees with the Discharge Plan?: Yes  Name of the Patient Representative Who was Provided with a Choice of Provider and Agrees with the Discharge Plan: Patient and  - Pablo Brown  Freedom of Choice List was Provided with Basic Dialogue that Supports the Patient's Individualized Plan of Care/Goals, Treatment Preferences and Shares the Quality Data Associated with the Providers?: Yes  Discharge Location  Discharge Placement: (Anticipate home w/ )    Fatemeh Corona RN, BSN, [address] [place] [address]    Coordinator  [phone]

## 2020-04-29 NOTE — PROGRESS NOTES
2:54 PM Patient arrived to room 3214 via wheelchair. VSS. Patient assisted to recliner in position of comfort. Patient oriented to room and call bell system. Call bell within reach. Report given to Mayo Blandon RN.

## 2020-04-29 NOTE — BRIEF OP NOTE
Brief Postoperative Note    Patient: Doretha Ramirez  YOB: 1965  MRN: [id number]    Date of Procedure: 4/28/2020     Pre-Op Diagnosis: CERVICALGIA, CERVICAL RADICULOPATHY, SPINAL STENOSIS IN CERVICAL REGION, FORAMINAL STENOSIS IN CERVICAL REGION, CERVICAL SPONDYLOLSIS    Post-Op Diagnosis: Same as preoperative diagnosis. Procedure(s):  C4-7 ANTERIOR CERVICAL DISCECTOMY WITH FUSION (CHOICE ANES)  (ESSENTIAL)    Surgeon(s):  Damion Berry MD    Surgical Assistant: None    Anesthesia: Other     Estimated Blood Loss (mL): less than 50     Complications: None    Specimens: * No specimens in log *     Implants:   Implant Name Type Inv.  Item Serial No.  Lot No. LRB No. Used Action   OsteoAMP Select Fiber (2.5cc) Bone  [id number] [facility] N/A N/A 1 Implanted   SCR ANT CERV VA 4.5X14MM -- [place] - SN/A  SCR ANT CERV VA 4.5X14MM -- [place] N/A [facility] [id number] N/A 1 Implanted   SCR ANT CERV VA 4.5X14MM -- [place] - SN/A  SCR ANT CERV VA 4.5X14MM -- [place] N/A [employer] [id number] N/A 1 Implanted   SCR SPNE CERV VAST 4X14MM -- [place] - SN/A  SCR SPNE CERV VAST 4X14MM -- [place] N/A [employer] [id number] N/A 2 Implanted   SCR SPNE CERV VAST 4X14MM -- [place] - SN/A  SCR SPNE CERV VAST 4X14MM -- Sekou Toscano N/A [employer] [id number] N/A 2 Implanted   SCR SPNE CERV VAST 4X14MM -- [place] - SN/A  SCR SPNE CERV VAST 4X14MM -- [employer] [id number] N/A 2 Implanted   SPACER CERV FLAT 6D [id number] -- [place] - SN/A  SPACER CERV FLAT 6D [id number] -- [employer] [id number] N/A 1 Implanted   SPACER CERV FLAT 6D [id number] -- [place] - SN/A  SPACER CERV FLAT 6D [id number] -- [employer] [id number] N/A 1 Implanted   SPACER CERV FLAT 6D [id number] -- [place] - SN/A  SPACER CERV FLAT 6D [id number] -- [place] N/A [employer] [facility] [id number] N/A 1 Implanted   51mm PLATE   N/A Gurjit Preston 74 N/A N/A 1 Implanted       Drains: * No LDAs found *    Findings: HNP    Electronically Signed by Yojana Carr MD on 4/28/2020 at 10:07 PM

## 2020-04-29 NOTE — CONSULTS
Consulting service: Orthopedics  Reason for consult: Vent management    CC: Unable to obtain    HPI: 47year old female with HTN who presented yesterday for ACDF of C4-7. In the PACU she was noted to have a hematoma of the surgical site and was taken back to the OR for evacuation. Left intubated post op and admitted to the ICU. This morning she is alert and responsive on the vent. Wants ETT out. Neck with some swelling but good cuff leak on ETT. PMH: HTN, anxiety, kidney stones, Csection  SH: + tobacco. No alcohol or other drugs  FH: Unable to obtain    ROS: Unable to obtain full ROS as she is intubated     PE:   Vitals stable  Gen: Alert and calm, no distress  HEENT: NCAT  Neck: Ccollar. Some swelling/bruising noted. Drain in place  Mouth: ETT with good cuff leak  CV: Regular  Lungs: CTA B/L  Abd: Soft, nontender  Ext: Moves all  Neuro: Alert, writing on white board    No labs available to review. CXR unremarkable other than ETT    Impression:  -C spine stenosis s/p ACDF C4-C7 4/28  -post op hematoma requiring return to OR for evacuation  -post op vent dependence  -baseline HTN, anxiety, tobacco abuse    Plan:  -doing well on SBT with good cuff leak--extubate  -low dose dexamethasone for a day or two in setting of extubation   -post op care per ortho  -pain control  -encourage IS once off vent  -PT/OT  -advance diet as tolerated  -resume home meds as needed/able  -SCD    Patient is critically ill and at high risk of decompensation.  CCT 32 minutes

## 2020-04-30 VITALS
HEART RATE: 79 BPM | DIASTOLIC BLOOD PRESSURE: 88 MMHG | WEIGHT: 145.94 LBS | OXYGEN SATURATION: 99 % | SYSTOLIC BLOOD PRESSURE: 172 MMHG | TEMPERATURE: 98 F | HEIGHT: 62 IN | BODY MASS INDEX: 26.86 KG/M2 | RESPIRATION RATE: 18 BRPM

## 2020-04-30 LAB
FACT VIII ACT/NOR PPP: 98 % (ref 56–140)
INTERPRETATION, 910378, CSIR1: NORMAL
VWF AG ACT/NOR PPP IA: 79 % (ref 50–200)
VWF:RCO ACT/NOR PPP PL AGG: 84 % (ref 50–200)

## 2020-04-30 PROCEDURE — 74011250636 HC RX REV CODE- 250/636: Performed by: INTERNAL MEDICINE

## 2020-04-30 PROCEDURE — 74011250637 HC RX REV CODE- 250/637: Performed by: NURSE PRACTITIONER

## 2020-04-30 PROCEDURE — 97165 OT EVAL LOW COMPLEX 30 MIN: CPT | Performed by: OCCUPATIONAL THERAPIST

## 2020-04-30 PROCEDURE — 97116 GAIT TRAINING THERAPY: CPT | Performed by: PHYSICAL THERAPIST

## 2020-04-30 PROCEDURE — 74011250637 HC RX REV CODE- 250/637: Performed by: ORTHOPAEDIC SURGERY

## 2020-04-30 PROCEDURE — 97161 PT EVAL LOW COMPLEX 20 MIN: CPT | Performed by: PHYSICAL THERAPIST

## 2020-04-30 PROCEDURE — 97535 SELF CARE MNGMENT TRAINING: CPT | Performed by: OCCUPATIONAL THERAPIST

## 2020-04-30 RX ORDER — OXYCODONE HYDROCHLORIDE 5 MG/1
5 TABLET ORAL
Qty: 40 TAB | Refills: 0 | Status: SHIPPED | OUTPATIENT
Start: 2020-04-30 | End: 2020-05-14

## 2020-04-30 RX ORDER — AMLODIPINE BESYLATE 5 MG/1
5 TABLET ORAL DAILY
Status: DISCONTINUED | OUTPATIENT
Start: 2020-04-30 | End: 2020-04-30 | Stop reason: HOSPADM

## 2020-04-30 RX ORDER — ACETAMINOPHEN 500 MG
1000 TABLET ORAL EVERY 6 HOURS
Qty: 112 TAB | Refills: 0 | Status: SHIPPED | OUTPATIENT
Start: 2020-04-30 | End: 2020-05-14

## 2020-04-30 RX ORDER — AMOXICILLIN 250 MG
1 CAPSULE ORAL 2 TIMES DAILY
Qty: 28 TAB | Refills: 0 | Status: SHIPPED | OUTPATIENT
Start: 2020-04-30 | End: 2020-05-14

## 2020-04-30 RX ORDER — POLYETHYLENE GLYCOL 3350 17 G/17G
17 POWDER, FOR SOLUTION ORAL DAILY
Qty: 14 PACKET | Refills: 0 | Status: SHIPPED | OUTPATIENT
Start: 2020-05-01 | End: 2020-05-15

## 2020-04-30 RX ADMIN — OXYCODONE 5 MG: 5 TABLET ORAL at 15:38

## 2020-04-30 RX ADMIN — OXYCODONE 5 MG: 5 TABLET ORAL at 03:47

## 2020-04-30 RX ADMIN — GABAPENTIN 100 MG: 100 CAPSULE ORAL at 15:38

## 2020-04-30 RX ADMIN — POLYETHYLENE GLYCOL 3350 17 G: 17 POWDER, FOR SOLUTION ORAL at 09:33

## 2020-04-30 RX ADMIN — ACETAMINOPHEN 1000 MG: 500 TABLET ORAL at 06:03

## 2020-04-30 RX ADMIN — DEXAMETHASONE SODIUM PHOSPHATE 4 MG: 4 INJECTION, SOLUTION INTRAMUSCULAR; INTRAVENOUS at 06:03

## 2020-04-30 RX ADMIN — GABAPENTIN 100 MG: 100 CAPSULE ORAL at 09:35

## 2020-04-30 RX ADMIN — Medication 10 ML: at 06:03

## 2020-04-30 RX ADMIN — Medication 10 ML: at 13:38

## 2020-04-30 RX ADMIN — PHENOL 1 SPRAY: 1.5 LIQUID ORAL at 06:20

## 2020-04-30 RX ADMIN — FAMOTIDINE 20 MG: 20 TABLET, FILM COATED ORAL at 09:35

## 2020-04-30 RX ADMIN — Medication 1 AMPULE: at 10:35

## 2020-04-30 RX ADMIN — ACETAMINOPHEN 1000 MG: 500 TABLET ORAL at 13:38

## 2020-04-30 RX ADMIN — DOCUSATE SODIUM - SENNOSIDES 1 TABLET: 50; 8.6 TABLET, FILM COATED ORAL at 09:35

## 2020-04-30 RX ADMIN — AMLODIPINE BESYLATE 5 MG: 5 TABLET ORAL at 09:35

## 2020-04-30 NOTE — PROGRESS NOTES
Ortho / Neurosurgery NP Note    POD# 2  s/p RE-ENTRY SPINE ANTERIOR CERVICAL SITE; DRAINAGE OF HEMATOMA   Pt seen by EDWIGE Tejada earlier today. Patient developed post-op hematoma, airway swelling - patient intubated and returned to OR for drainage of hematoma. Transfered to CCU post-op and extubated on POD#1. Pt resting in chair. Feels well. Incisional pain well controlled with prn oxycodone. Reports numbness in manan hands prior to surgery, symptoms still present post-op, patient feels some improvement in numbness   BP elevated - denies CP, HA, dizziness, sob. Reports hx intermittent HTN, monitored by PCP, never started on anti-hypertensives. Monitors BP at home and records regularly - states usually SBP 110s  Tolerating regular diet. VSS Afebrile. Visit Vitals  /81 (BP 1 Location: Right arm, BP Patient Position: At rest;Sitting)   Pulse 81   Temp 98.1 °F (36.7 °C)   Resp 18   Ht 5' 1.5\" (1.562 m)   Wt 66.2 kg (145 lb 15.1 oz)   LMP 04/09/2020   SpO2 97%   BMI 27.13 kg/m²       Voiding status: voiding   Output (mL)  Last Bowel Movement Date: 04/28/20 (04/29/20 2015)      Labs    Lab Results   Component Value Date/Time    HGB 15.6 04/23/2020 12:54 PM      Lab Results   Component Value Date/Time    INR 1.0 04/29/2020 01:06 PM      Lab Results   Component Value Date/Time    Sodium 139 04/23/2020 12:54 PM    Potassium 4.1 04/23/2020 12:54 PM    Chloride 106 04/23/2020 12:54 PM    CO2 29 04/23/2020 12:54 PM    Glucose 102 (H) 04/23/2020 12:54 PM    BUN 15 04/23/2020 12:54 PM    Creatinine 0.64 04/23/2020 12:54 PM    Calcium 9.3 04/23/2020 12:54 PM     Recent Glucose Results: No results found for: GLU, GLUPOC, GLUCPOC        Body mass index is 27.13 kg/m². : A BMI > 30 is classified as obesity and > 40 is classified as morbid obesity. Prineo dressing c.d.i - Cervical collar in place, no apparent swelling noted, mild bruising at dieter-operative site.    Cryotherapy in place over incision  VENKATESH Drain charged - d/c today   Calves soft and supple; No pain with passive stretch  Sensation and motor intact - C5-T1 intact 5/5, noted weakness in left fingers, manan  strength 5/5  SCDs for mechanical DVT proph while in bed     PLAN:  1) Neurovascular assessment q4 hours   2) PT/OT   3) Pain control - scheduled tylenol, prn oxycodone   4) Post-op Hematoma - Hematology consulted - Von Willebrand panel pending  5) HTN - start norvasc 5 mg this morning, reassess BP later today. Patient instructed to continue checking BP at home TID and follow up with PCP within 1 week. 6) Readniess for discharge:     [x] Vital Signs stable    [x] + Voiding    [x] Wound intact, drainage minimal    [x] Tolerating PO intake     [x] Cleared by PT (OT if applicable)     [x] Stair training completed (if applicable)    [x] Independent / Contact Guard Assist (household distance)     [x] Bed mobility     [x] Car transfers     [x] ADLs    [x] Adequate pain control on oral medication alone     Discharge pending BP control. 11:30- BP remains elevated following Norvasc. Patient denies pain, resting in bed when BP was obtained. States steroids have caused her BP to spike in past. Spoke with Dr Jax Clark regarding above, d/c steroids. F/U with PCP as soon as possible, continue recording BP measurements at home.       Demario Ansari NP

## 2020-04-30 NOTE — PROGRESS NOTES
Hematology Oncology Progress Note         Follow up for:[postop bleeding, history of easy bruising, called free bleeder by another MD when cut self on pineapple can. Chart notes reviewed since last visit. Case discussed with following: patient. Patient complains of the following: hoping to go home but has appreciated the break from household duties. Additional concerns noted by the staff:     Patient Vitals for the past 24 hrs:   BP Temp Pulse Resp SpO2   04/30/20 1230 173/78       04/30/20 1118 (!) 189/95 97.6 °F (36.4 °C) 84 16 98 %   04/30/20 0800 182/81 98.1 °F (36.7 °C) 81 18 97 %   04/30/20 0334 154/82 97.4 °F (36.3 °C) 81 18 95 %   04/29/20 2332 154/78 98.3 °F (36.8 °C) 72 18 100 %   04/29/20 2015 156/74 97.8 °F (36.6 °C) 77 18 100 %   04/29/20 1454 148/65 97.5 °F (36.4 °C) 75 20 100 %   04/29/20 1400   87 21 99 %   04/29/20 1345   92 20 100 %   04/29/20 1325  97.9 °F (36.6 °C)      04/29/20 1300 (!) 158/117  89 20        ROS negative for 11 organ systems except as noted. Physical Examination:  Constitutional Alert, cooperative, oriented. Mood and affect appropriate. Appears close to chronological age. Well nourished. Well developed. Head Normocephalic; no scars   Eyes Conjunctivae and sclerae are clear and without icterus. Pupils are round. ENMT Sinuses are nontender. No oral exudates, ulcers, masses, thrush or mucositis. Oropharynx clear. Tongue normal.   Neck In hard collar   Hematologic/Lymphatic No petechiae or purpura. No tender or palpable lymph nodes noted. Respiratory Lungs are clear to auscultation without rhonchi or wheezing. Cardiovascular Regular rate and rhythm of heart without murmurs, gallops or rubs. Chest / Line Site Chest is symmetric with no chest wall deformities. Abdomen Non-tender, non-distended, no masses, ascites or hepatosplenomegaly. Good bowel sounds.    Musculoskeletal No tenderness or swelling, normal range of motion without obvious weakness. Extremities No visible deformities, no cyanosis, clubbing or edema. Skin No rashes, scars, or lesions suggestive of malignancy. No petechiae, purpura, or ecchymoses. No excoriations. Neurologic No sensory or motor deficits noted but not specifically tested. Psychiatric Alert. Coherent speech. Verbalizes understanding of our discussions today. Labs:  Recent Results (from the past 24 hour(s))   PROTHROMBIN TIME + INR    Collection Time: 04/29/20  1:06 PM   Result Value Ref Range    INR 1.0 0.9 - 1.1      Prothrombin time 10.7 9.0 - 11.1 sec   PTT    Collection Time: 04/29/20  1:06 PM   Result Value Ref Range    aPTT 27.6 22.1 - 32.0 sec    aPTT, therapeutic range     58.0 - 77.0 SECS       Assessment and Plan:   Postop bleeding requiring re-operation and hx of easy bruising. PT and PTT normal which rules out many of the acquired inhibitors and congenital severe bleeding disorders. Await VWD panel. She will call office in 2 weeks for results. Thanks for the consult    S/p C4-7 anterior cervical discectomy with fusion. Hypertension - slightly better this afternoon.           Fang Mix MD [facility] office  [address]  [address] [facility] [address], [address]  Phone [phone]  Fax [phone]

## 2020-04-30 NOTE — ROUTINE PROCESS
RAPID RESPONSE TEAM- Follow Up Rounded on patient due to recent transfer from CCU. Patient is in bed, alert, NAD. No RRT interventions indicated at this time. Please call back if needed. Adrienne Bouquet, RN Cosette Dakin Vitals w/ MEWS Score (last day) Date/Time MEWS Score Pulse Resp Temp BP Level of Consciousness SpO2  
 04/30/20 0800  1  81  18  98.1 °F (36.7 °C)  182/81  Alert  97 % 04/30/20 0334  1  81  18  97.4 °F (36.3 °C)  154/82  Alert  95 % 04/29/20 2332  1  72  18  98.3 °F (36.8 °C)  154/78  Alert  100 % 04/29/20 2015  1  77  18  97.8 °F (36.6 °C)  156/74  Alert  100 % 04/29/20 1752            Alert    
 04/29/20 1454  1  75  20  97.5 °F (36.4 °C)  148/65  Alert  100 % 04/29/20 1400    87  21        99 % 04/29/20 1345    92  20        100 % 04/29/20 1325        97.9 °F (36.6 °C)        
 04/29/20 1300    89  20    (!) 158/117      
 04/29/20 1200    83  22    (!) 155/98    100 % 04/29/20 1144    96  17        100 % 04/29/20 1137          175/81      
 04/29/20 1100              99 % 04/29/20 1000    93  17    (!) 151/135    98 % 04/29/20 0900    92  16    144/73    100 % 04/29/20 0800    85  15    139/70    99 % 04/29/20 0734    85  22        100 % 04/29/20 0700    87  15  99.2 °F (37.3 °C)  126/61    100 % 04/29/20 0600    84  23    116/70    100 % 04/29/20 0500    79  12  98.3 °F (36.8 °C)  104/59    100 % 04/29/20 0443      12          
 04/29/20 0400    78  12  98 °F (36.7 °C)  92/60    100 % 04/29/20 0303    77  14    (!) 89/55    100 % 04/29/20 0300  3  77  12  99.1 °F (37.3 °C)  (!) 79/56  Responds to Voice  100 % 04/29/20 0209        99 °F (37.2 °C)        
 04/29/20 0202    92  12    146/83    97 % 04/29/20 0145    81  16  98.3 °F (36.8 °C)  131/80    100 % 04/29/20 0130    81  12    123/74    100 % 04/29/20 0115    83  12    134/76    100 % 04/29/20 0100    86  21    (!) 155/103    100 % 04/29/20 0055    89  20    182/87    100 % 04/29/20 0050    91  19    (!) 180/91    100 % 04/29/20 0045    79  12    130/72    100 % 04/29/20 0042    77  12        100 % 04/29/20 0040    78  14    (!) 111/98    100 % 04/29/20 0039    83  14  98.1 °F (36.7 °C)  123/67    100 %

## 2020-04-30 NOTE — DISCHARGE SUMMARY
Spine Discharge Summary    Patient ID:  Tor Schooling  [id number]  female  47 y.o.  1965    Admit date: 4/28/2020    Discharge date: 4/30/2020    Admitting Physician: Yojana Carr MD     Consulting Physician(s):   Treatment Team: Attending Provider: Colby Lanier MD; Utilization Review: Jonathon Ernandez RN; Care Manager: Karl Peacock RN; Consulting Provider: Atilio Silvestre MD; Utilization Review: Aubree Aguayo    Date of Surgery:   4/28/2020     Preoperative Diagnosis:   1. Cervical disk herniation at C4-5 with myelopathy and myelomalacia. 2.  Cervical spinal stenosis C5 to C7.  3.  Cervical myelopathy. 4.  Cervical radiculopathy. 5.  Hematoma status  post ANTERIOR CERVICAL FUSION    Postoperative Diagnosis:     1. Cervical disk herniation at C4-5 with myelopathy and myelomalacia. 2.  Cervical spinal stenosis C5 to C7.  3.  Cervical myelopathy. 4.  Cervical radiculopathy. 5.  HEMATOMA STATUS POST ANTERIOR CERVICAL FUSION    Procedure(s): C4-7 ANTERIOR CERVICAL DISCECTOMY WITH FUSION   RE-ENTRY SPINE ANTERIOR CERVICAL SITE; DRAINAGE OF HEMATOMA     Anesthesia Type:   General     Surgeon: Colby Lanier MD                            HPI:  Pt is a 47 y.o. female who has a history of hematoma status  post ANTERIOR CERVICAL FUSION  with pain and limitations of activities of daily living who presents at this time for a C4-7 ACDF and re-entry for post-op hematoma following the failure of conservative management. PMH:   Past Medical History:   Diagnosis Date    Anxiety     Hypertension     Ill-defined condition 2020    kidney stones       Body mass index is 27.13 kg/m². : A BMI > 30 is classified as obesity and > 40 is classified as morbid obesity. Medications upon admission :   Prior to Admission Medications   Prescriptions Last Dose Informant Patient Reported?  Taking?   diazepam (VALIUM) 5 mg tablet 4/27/2020 at Unknown time  No Yes   Sig: Take 1 Tab by mouth every eight (8) hours as needed for Anxiety. norethindrone (MICRONOR) 0.35 mg tab 4/28/2020 at 1145  Yes Yes   Sig: Take  by mouth daily. Facility-Administered Medications: None        Allergies: Allergies   Allergen Reactions    Penicillin G Hives     Old allergy - does not recall if [facility]: The patient underwent surgery. Complications: post-op hematoma developed in PACU, patient taken back to OR for re-entry. Patient tolerated the procedure well. Was transferred to CCU for vent management and extubated later that day 4/29. She was later transferred to ortho unit. Hematology consulted, labs pending. Patient can follow-up on lab results outpatient. Perioperative Antibiotics:  Ancef     Postoperative Pain Management:  Oxycodone & Tylenol     Postoperative transfusions:    Number of units banked PRBCs =   none     Post Op complications: none    Hemoglobin at discharge:    Lab Results   Component Value Date/Time    HGB 15.6 04/23/2020 12:54 PM    INR 1.0 04/29/2020 01:06 PM       Prineo dressing remained clean, dry and intact. Cervical collar in place. No significant erythema or swelling. Wound appears to be healing without any evidence of infection. Pt had a VENKATESH drain that was removed on POD# 2. Neurovascular exam found to be within normal limits. Patient reports some improvement in numbness in bilateral hands. Physical Therapy started following surgery and participated in bed mobility, transfers and ambulation. Gait:  Gait  Base of Support: Narrowed  Ambulation - Level of Assistance: Independent  Distance (ft): 300 Feet (ft)  Rail Use: Left   Number of Stairs Trained: 4                   Discharged to: Home.     Condition on Discharge:   stable    Discharge instructions:  - Take pain medications as prescribed  - Record BP measurements TID, take log to f/u PCP appointment   - Resume pre hospital diet      - Discharge activity: activity as tolerated  - Ambulate as tolerated  - Cervical Collar  - Wound Care Keep wound clean and dry. See discharge instruction sheet. -DISCHARGE MEDICATION LIST     Current Discharge Medication List      START taking these medications    Details   acetaminophen (TYLENOL) 500 mg tablet Take 2 Tabs by mouth every six (6) hours for 14 days. Qty: 112 Tab, Refills: 0      oxyCODONE IR (ROXICODONE) 5 mg immediate release tablet Take 1 Tab by mouth every four (4) hours as needed for Pain for up to 14 days. Max Daily Amount: 30 mg.  Qty: 40 Tab, Refills: 0    Associated Diagnoses: S/P cervical spinal fusion      polyethylene glycol (MIRALAX) 17 gram packet Take 1 Packet by mouth daily for 14 days. Qty: 14 Packet, Refills: 0      senna-docusate (PERICOLACE) 8.6-50 mg per tablet Take 1 Tab by mouth two (2) times a day for 14 days. Qty: 28 Tab, Refills: 0         CONTINUE these medications which have NOT CHANGED    Details   norethindrone (MICRONOR) 0.35 mg tab Take  by mouth daily. diazepam (VALIUM) 5 mg tablet Take 1 Tab by mouth every eight (8) hours as needed for Anxiety.   Qty: 20 Tab, Refills: 0          per medical continuation form      -Follow up in office in 2 weeks  -Follow up with PCP for BP management as soon as possible.   -Follow up with Hematology in 2 weeks      Signed:  Alicja Figueroa NP  Orthopaedic Nurse Practitioner    4/30/2020  1:26 PM

## 2020-04-30 NOTE — PROGRESS NOTES
.Bedside and Verbal shift change report given to [facility] [id number] (oncoming nurse) by Cathleen Barraza (offgoing nurse). Report included the following information SBAR, Kardex, Intake/Output, MAR and Recent Results.

## 2020-04-30 NOTE — PROGRESS NOTES
Orthopedic End of Shift Note    Bedside shift change report given to Everton Johnson RN (oncoming nurse) by Renita Linn RN (offgoing nurse). Report included the following information SBAR, Kardex, OR Summary, Procedure Summary, Intake/Output, MAR, Accordion, Recent Results and Med Rec Status.      POD#     Significant issues during shift: none    Issues for Physician to address: none    Activity This Shift  (check all that apply) [x] chair  [] dangle   [x] bathroom  [] bedside commode [] hallway  [] bedrest   Nausea/Vomiting [] yes [x] no     Voiding Status [] void [] [place] [] I&O Cath   Bowel Movements [] yes [x] no     Foot Pumps or SCD [] yes [] no    Ice Pack [] yes    [] no    Incentive Spirometer [x] yes [] no Volume:   1750   Telemetry Monitoring   [] yes [x] no Rhythm:   Supplemental O2 [] yes [x] no Sat off O2:   100%

## 2020-04-30 NOTE — PROGRESS NOTES
RAPID RESPONSE TEAM     Rounded on patient due to recent transfer from CCU. Spoke with 1200 Zachary Monreal primary RN who has no concerns at this time. Patient is in bed, alert, NAD. No RRT interventions indicated at this time. Please call if needed. Carol Corley, RN  Ext. [phone]    Vitals w/ MEWS Score (last day)     Date/Time MEWS Score Pulse Resp Temp BP Level of Consciousness SpO2    04/29/20 2015  1  77  18  97.8 °F (36.6 °C)  156/74  Alert  100 %    04/29/20 1752            Alert      04/29/20 1454  1  75  20  97.5 °F (36.4 °C)  148/65  Alert  100 %    04/29/20 1400    87  21        99 %    04/29/20 1345    92  20        100 %    04/29/20 1325        97.9 °F (36.6 °C)          04/29/20 1300    89  20    (!) 158/117        04/29/20 1200    83  22    (!) 155/98    100 %    04/29/20 1144    96  17        100 %    04/29/20 1137          175/81        04/29/20 1100              99 %    04/29/20 1000    93  17    (!) 151/135    98 %    04/29/20 0900    92  16    144/73    100 %    04/29/20 0800    85  15    139/70    99 %    04/29/20 0734    85  22        100 %    04/29/20 0700    87  15  99.2 °F (37.3 °C)  126/61    100 %    04/29/20 0600    84  23    116/70    100 %    04/29/20 0500    79  12  98.3 °F (36.8 °C)  104/59    100 %    04/29/20 0443      12            04/29/20 0400    78  12  98 °F (36.7 °C)  92/60    100 %    04/29/20 0303    77  14    (!) 89/55    100 %    04/29/20 0300  3  77  12  99.1 °F (37.3 °C)  (!) 79/56  Responds to Voice  100 %    04/29/20 0209        99 °F (37.2 °C)          04/29/20 0202    92  12    146/83    97 %    04/29/20 0145    81  16  98.3 °F (36.8 °C)  131/80    100 %    04/29/20 0130    81  12    123/74    100 %    04/29/20 0115    83  12    134/76    100 %    04/29/20 0100    86  21    (!) 155/103    100 %    04/29/20 0055    89  20    182/87    100 %    04/29/20 0050    91  19   (!) 180/91    100 %    04/29/20 0045    79  12    130/72    100 %    04/29/20 0042    77  12        100 %    04/29/20 0040    78  14    (!) 111/98    100 %    04/29/20 0039    83  14  98.1 °F (36.7 °C)  123/67    100 %    04/28/20 2314    85  18    92/58    98 %    04/28/20 2305    82  11    (!) 152/137    98 %    04/28/20 2300    79  20    128/66    98 %    04/28/20 2255    78  15    149/74    99 %    04/28/20 2250    74  13    131/83    100 %    04/28/20 2245    72  14    171/70    100 %    04/28/20 2240    76  20    161/85    100 %    04/28/20 2239          148/79        04/28/20 2235    77  19    172/87    100 %    04/28/20 2228    85  11  98 °F (36.7 °C)  178/84    100 %    04/28/20 1611    75  10  98.1 °F (36.7 °C)  140/89    98 %

## 2020-04-30 NOTE — PROGRESS NOTES
PHYSICAL THERAPY EVALUATION/DISCHARGE  Patient: Derek Clemente (36 y.o. female)  Date: 4/30/2020  Primary Diagnosis: Cervical stenosis of spinal canal [M48.02]  Procedure(s) (LRB):  RE-ENTRY SPINE ANTERIOR CERVICAL SITE; DRAINAGE OF HEMATOMA (N/A) 2 Days Post-Op   Precautions:   Back, Fall      ASSESSMENT  Based on the objective data described below, the patient presents with near baseline level of function. Patient independent with all bed mobility, transfers, gait and stairs. Educated on spinal precautions with good teachback. Patient is at her baseline and is safe to DC home with family. Safe to be up ad maria isabel until DC. Patient is cleared for discharge from PT standpoint:  YES [x]     NO []     Other factors to consider for discharge: none     Further skilled acute physical therapy is not indicated at this time. PLAN :  Recommendation for discharge: (in order for the patient to meet his/her long term goals)  No skilled physical therapy/ follow up rehabilitation needs identified at this time. IF patient discharges home will need the following DME: none       SUBJECTIVE:   Patient stated I feel better and I was up walking all night.     OBJECTIVE DATA SUMMARY:   HISTORY:    Past Medical History:   Diagnosis Date    Anxiety     Hypertension     Ill-defined condition 2020    kidney stones     Past Surgical History:   Procedure Laterality Date    HX GYN  1988    c/section under GA    HX HEENT  1986    tonsilectomy       Prior level of function: Independent with mobility  Personal factors and/or comorbidities impacting plan of care:     Home Situation  Home Environment: Private residence  # Steps to Enter: 3  Rails to Enter: Yes  Hand Rails : Left  Wheelchair Ramp: No  One/Two Story Residence: Two story, live on 1st floor  Interior Rails: Both  Lift Chair Available: No  Living Alone: No  Support Systems: Spouse/Significant Other/Partner  Patient Expects to be Discharged [facility] [employer] residence  Current DME Used/Available at Home: None  Tub or Shower Type: Tub/Shower combination    EXAMINATION/PRESENTATION/DECISION MAKING:   Critical Behavior:  Neurologic State: Alert, Appropriate for age  Orientation Level: Oriented X4  Cognition: Follows commands, Appropriate decision making       Range Of Motion:  AROM: Within functional limits                       Strength:    Strength: Within functional limits          Functional Mobility:  Bed Mobility:   not tested           Transfers:  Sit to Stand: Independent  Stand to Sit: Independent                       Balance:   Sitting: Intact  Standing: Intact  Ambulation/Gait Training:  Distance (ft): 300 Feet (ft)     Ambulation - Level of Assistance: Independent                 Base of Support: Narrowed        Stairs:  Number of Stairs Trained: 4      Rail Use: Left       Activity Tolerance:   Good  Please refer to the flowsheet for vital signs taken during this treatment. After treatment patient left in no apparent distress:   Sitting in chair and Call bell within reach    COMMUNICATION/EDUCATION:   The patients plan of care was discussed with: Physical therapist, Occupational therapist and Registered nurse. Fall prevention education was provided and the patient/caregiver indicated understanding., Patient/family have participated as able in goal setting and plan of care. and Patient/family agree to work toward stated goals and plan of care.     Thank you for this referral.  Saniya Marrero, PT, DPT   Time Calculation: 14 mins

## 2020-04-30 NOTE — PROGRESS NOTES
OCCUPATIONAL THERAPY EVALUATION/DISCHARGE  Patient: Brigitte Meraz (54 y.o. female)  Date: 4/30/2020  Primary Diagnosis: Cervical stenosis of spinal canal [M48.02]  Procedure(s) (LRB):  RE-ENTRY SPINE ANTERIOR CERVICAL SITE; DRAINAGE OF HEMATOMA (N/A) 2 Days Post-Op   Precautions:  Back, Fall    ASSESSMENT  Based on the objective data described below, the patient presents with post op cervical spine precautions and paresthesias in B hands. Despite her precautions and paresthesias she was able to demonstrate the ability to perform ADLs at an independent to mod I level and is independent with functional mobility, s/p training and education provided. Patient is consistently adherent to her cervical spine precautions and demonstrates good overall safety awareness. In regards to c-collar management she is performing at a supervision level. At this tme she is without further OT needs and she will have her  to provide any needed assistance after discharge. Functional Outcome Measure: The patient scored 100/100 on the Barthel Index outcome measure which is indicative of 100% decline in function. PLAN :  Recommendation for discharge: (in order for the patient to meet his/her long term goals)  No skilled occupational therapy/ follow up rehabilitation needs identified at this time.     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   Expanded or extensive additional review of patient history:   Home Situation  Home Environment: Private residence  # Steps to Enter: 3  Rails to Enter: Yes  [employer] : Left  Wheelchair Ramp: No  One/Two Story Residence: Two story, live on 1st floor  Interior Rails: Both  Lift Chair Available: No  Living Alone: No  Support Systems: Spouse/Significant Other/Partner  Patient Expects to be Discharged to[de-identified] Private residence  Current DME Used/Available at Home: None  Tub or Shower Type: Tub/Shower combination    Hand dominance: Right    EXAMINATION OF PERFORMANCE DEFICITS:  Cognitive/Behavioral Status:  Neurologic State: Alert  Orientation Level: Oriented X4  Cognition: Appropriate decision making; Appropriate for age attention/concentration; Appropriate safety awareness; Follows commands        Safety/Judgement: Awareness of environment; Insight into deficits;Good awareness of safety precautions    Hearing:       Vision/Perceptual:    Acuity: Within Defined Limits         Range of Motion:  AROM: Within functional limits    Strength:  Strength: Within functional limits  Coordination:  Coordination: Within functional limits  Fine Motor Skills-Upper: Left Intact; Right Intact    Gross Motor Skills-Upper: Left Intact; Right Intact    Tone & Sensation:  Tone: Normal  Sensation: Impaired(paresthesias in B hands)    Balance:  Sitting: Intact  Standing: Intact    Functional Mobility and Transfers for ADLs:  Bed Mobility:       Transfers:  Sit to Stand: Independent  Stand to Sit: Independent  Bed to Chair: Independent  Bathroom Mobility: Independent  Toilet Transfer : Independent    ADL Assessment:  Feeding: Independent    Oral Facial Hygiene/Grooming: Independent    Bathing: Independent    Upper Body Dressing: Modified independent    Lower Body Dressing: Independent    Toileting: Independent                ADL Intervention and task modifications:       Grooming  Position Performed: Standing  Washing Hands: Independent              Upper Body Dressing Assistance  Pullover Shirt: Modified independent; Compensatory technique training  Cues: Verbal cues provided;Visual cues provided    Lower Body Dressing Assistance  Underpants: Independent  Pants With Elastic Waist: Independent  Socks: Independent  Leg Crossed Method Used: Yes  Position Performed: Standing;Seated in chair         Cognitive Retraining  Safety/Judgement: Awareness of environment; Insight into deficits;Good awareness of safety precautions    Showering: Instructed patient to wear c-collar when showering to ensure adherence to post op c-spine precautions, and to have family member change c-collar pads upon completion. Patient verbalized full understanding. Upper Body Dressing:  Patient instructed to perform donning of pull over shirt or button down shirt in front of mirror for visual feedback to ensure she is being adherent to c-spine precautions. Further instructed patient to helga pullover shirt by bringing collar of shirt overhead and once head is through collar to then place each UE through the sleeve of the shirt. Instructed patient to doff pull over shirt by removing each UE from the sleeve before pulling off over head. Patient able to perform with modified independence  Cervical Collar Training:   Instructed patient on donning/doffing of c-collar, detaching only one side of collar. Patient was instructed on proper donning/doffing of c-collar when fully detaching front from back. Patient nstructed on proper removal and replacement of c-collar pads via demonstration. Upon completion of all c-collar training provided, patient was able to verbalize and/or demonstrate full understanding. Functional Measure:  Barthel Index:    Bathin  Bladder: 10  Bowels: 10  Groomin  Dressing: 10  Feeding: 10  Mobility: 15  Stairs: 10  Toilet Use: 10  Transfer (Bed to Chair and Back): 15  Total: 100/100        The Barthel ADL Index: Guidelines  1. The index should be used as a record of what a patient does, not as a record of what a patient could do.   2. The main aim is to establish degree of independence from any help, physical or verbal, however minor and for whatever reason. 3. The need for supervision renders the patient not independent. 4. A patient's performance should be established using the best available evidence. Asking the patient, friends/relatives and nurses are the usual sources, but direct observation and common sense are also important. However direct testing is not needed. 5. Usually the patient's performance over the preceding 24-48 hours is important, but occasionally longer periods will be relevant. 6. Middle categories imply that the patient supplies over 50 per cent of the effort. 7. Use of aids to be independent is allowed. Travis Ceja., Barthel, DSAMUEL (1414). Functional evaluation: the Barthel Index. [address] [facility] (14)2. Jeff Olivarez evan SUSAN Martines, Clark Trevino., Marky Ruiz., Santosh, [facility] (1999). Measuring the change indisability after inpatient rehabilitation; comparison of the responsiveness of the Barthel Index and Functional [place] Measure. Journal of Neurology, Neurosurgery, and Psychiatry, 66(4), 812-051. Maria R Dowell, NMikeJ.MINOO, ABI Will, & Nathen Hodge MMikeA. (2004.) Assessment of post-stroke quality of life in cost-effectiveness studies: The usefulness of the Barthel Index and the EuroQoL-5D. Quality of Life Research, 13, 227-46      Pain Rating:  No c/o pain    Activity Tolerance:   Good  Please refer to the flowsheet for vital signs taken during this treatment. After treatment patient left in no apparent distress:    Sitting in chair and Call bell within reach    COMMUNICATION/EDUCATION:   The patients plan of care was discussed with: Physical Therapist and Registered Nurse.     Thank you for this referral.  Amadeo Moore, OTR/L  Time Calculation: 25 mins

## 2020-04-30 NOTE — DISCHARGE INSTRUCTIONS
Gurjit Preston 74    Discharge Instruction Sheet: Anterior Cervical Fusion    DR. Tone Aggarwal    Blood pressure: Take and record your blood pressure three times a day. Take record log to follow-up appointment with PCP. Pain control:  Typically, we will prescribe a narcotic usually 1-2 tabs every four hours is sufficient for the pain. Most patients need this only for the first few weeks. You should discontinue this as the pain decreases. You should not drive while taking any narcotic pain medications. Constipation  Pain medicines and anesthesia can be constipating-this can be prevented by gentle physical activity and drinking plenty of fluid. It should be treated with over-the-counter medications such as Miralax or suppositories, and/or Fleets enema. You should have a bowel movement at least every other day following surgery. Incision care   Keep this area clean and dry. Do not remove the dressing. DO NOT take a tub bath or go swimming until cleared by your doctor. DO NOT apply lotions, oils, or creams to incision. Cover the wound with an impermiable dressing to shower for then next 5 days, then no cover is needed. Wear cervical collar for comfort. If staples are in place, they should be removed about 14-20 days after surgery. To increase and promote healing:   Stop Smoking (or at least cut back on smoking).  Eat a well-balanced diet (high in protein and vitamin C)   If your appetite is poor, consider nutritional supplements like Ensure, Glucerna, or Colby Instant Breakfast.   If you are diabetic, controlling you blood sugars is very important to prevent infection and promote wound healing. Nutrition:   If you were on a supplement such as Ensure or Glucerna) while in the hospital, please continue using them with each meal for the next 30 days.    Eat a well-balanced diet - High in protein, high in vitamins and minerals, especially vitamin C and zinc.     Restrictions:  Limited bending at waist  Lift no more than 10 pounds    Warning signs :   Please call your physician IMMEDIATELY at [phone] if you have:   If you have throat soreness that worsens   If you have difficulty swallowing.  If you have any difficulty breathing   Bleeding from incision that is constant.  Change in mental status (unusual behavior or confusion)   If your incision develops redness or swelling   Change in wound drainage (increase in amount, color, or foul odor)   [place] over 101.5 degrees Fahrenheit    Headache that is not relieved with pain medication   Tenderness or redness in the calf of your leg    Emergency: CALL 911 if you have:   Any Difficulty Breathing or Shortness of breath   Difficulty Swallowing   Chest pain   Localized chest pain when coughing or taking a deep breath    Derek Clemente    Follow-up  Please call Dr. Cirilo Lynch office for a follow up appointment in 2-3 weeks at [phone]. You can return to work when cleared by a physician. During normal business hours you may reach Dr. Eric Judd' team directly at [phone] if you have concerns or questions.

## 2020-04-30 NOTE — PROGRESS NOTES
Transition of Care   · OP follow-up     Pt to discharge home. No identified discharge needs at this time.        Magaly Dorsey, [facility], [address] [place] [address]

## 2020-04-30 NOTE — PROGRESS NOTES
ORTHO POST OP SPINE PROGRESS NOTE    2020  Admit Date: 2020  Admit Diagnosis: Cervical stenosis of spinal canal [M48.02]  Procedure: Procedure(s):  RE-ENTRY SPINE ANTERIOR CERVICAL SITE; DRAINAGE OF HEMATOMA  Post Op day: 2 Days Post-Op    Subjective:     Ely Howe is a patient who has complaints Of mild neck pain status post C4 through C7 ACDF. Immediate postoperative course complicated by hematoma and she was brought back to the OR for hematoma drainage. Was sent to  CCU and remained intubated overnight. Yesterday she was extubated and returned to Ortho for. This morning she notes that her arms and legs are feeling okay and has been up and walking in the floors. Tolerating p.o. and able to void. Review of Systems: Pertinent items are noted in HPI. Objective:     PT/OT:   Distance Ambulated:           Time Ambulated (min):        Ambulation Response: Activity Response: Fairly tolerated  Assistive Device:              Assistive Device: Fall prevention device    Vital Signs:    Blood pressure 154/82, pulse 81, temperature 97.4 °F (36.3 °C), resp. rate 18, height 5' 1.5\" (1.562 m), weight 66.2 kg (145 lb 15.1 oz), last menstrual period 2020, SpO2 95 %. Temp (24hrs), Av.8 °F (36.6 °C), Min:97.4 °F (36.3 °C), Max:98.3 °F (36.8 °C)      No intake/output data recorded.  1901 -  0700  In: 4479.9 [P.O.:840; I.V.:3639.9]  Out: 2160 [Urine:1860; Drains:150]    LAB:    No results for input(s): HGB, HGBEXT, WBC, PLT, PLTEXT, HGBEXT, PLTEXT in the last 72 hours. Wound/Drain Assessment:  Drain:      Dressing:     Physical Exam:  Neurological: no deficit  Incision clean, dry, and intact  5/5 strength bilateral upper extremities    Assessment:      Patient Active Problem List   Diagnosis Code    Cervical stenosis of spinal canal M48.02       Plan:     Continue PT/OT/Rehab  Discontinue: IV and drain Cervical  Consult: PT  and OT    Discharge To: Home.   Likely today Appreciate assistance from CCU  virus and Dr. Hyman Spatz.   Has von Willebrand's panel ordered although can discuss as outpatient with Dr. Hyman Spatz

## 2020-04-30 NOTE — PROGRESS NOTES
Discharge medications reviewed with patient and appropriate educational materials and side effects teaching were provided. Opportunity for questions and clarification provided. Patient leaving via car with  to private residence. No changes in VS or assessment upon discharge.

## 2020-05-13 NOTE — ANESTHESIA POSTPROCEDURE EVALUATION
Procedure(s):  C4-7 ANTERIOR CERVICAL DISCECTOMY WITH FUSION. general    Anesthesia Post Evaluation        Patient location during evaluation: PACU  Note status: Adequate. Level of consciousness: responsive to verbal stimuli and sleepy but conscious  Pain management: satisfactory to patient  Airway patency: patent  Anesthetic complications: no  Cardiovascular status: acceptable  Respiratory status: acceptable  Hydration status: acceptable  Comments: +Post-Anesthesia Evaluation and Assessment    Patient: Tony Adam MRN: [id number]  SSN: [id number]   YOB: 1965  Age: 47 y.o. Sex: female      Cardiovascular Function/Vital Signs    /88   Pulse 79   Temp 36.7 °C (98 °F)   Resp 18   Ht 5' 1.5\" (1.562 m)   Wt 66.2 kg (145 lb 15.1 oz)   SpO2 99%   BMI 27.13 kg/m²     Patient is status post Procedure(s):  C4-7 ANTERIOR CERVICAL DISCECTOMY WITH FUSION. Nausea/Vomiting: Controlled. Postoperative hydration reviewed and adequate. Pain:  Pain Scale 1: Numeric (0 - 10) (04/30/20 1611)  Pain Intensity 1: 0 (04/30/20 1611)   Managed. Neurological Status:   Neuro (WDL): Exceptions to WDL (04/29/20 0145)   At baseline. Mental Status and Level of Consciousness: Arousable. Pulmonary Status:   O2 Device: Room air (04/30/20 1611)   Adequate oxygenation and airway patent. Complications related to anesthesia: None    Post-anesthesia assessment completed. No concerns.     Signed By: Jonna Hernandez MD    5/13/2020  Post anesthesia nausea and vomiting:  controlled      Vitals Value Taken Time   BP 92/58 4/28/2020 11:14 PM   Temp 36.7 °C (98 °F) 4/28/2020 10:28 PM   Pulse 85 4/28/2020 11:14 PM   Resp 18 4/28/2020 11:14 PM   SpO2 98 % 4/28/2020 11:14 PM

## 2020-10-06 ENCOUNTER — DOCUMENTATION ONLY (OUTPATIENT)
Dept: NEUROLOGY | Facility: CLINIC | Age: 55
End: 2020-10-06

## 2020-11-02 ENCOUNTER — OFFICE VISIT (OUTPATIENT)
Dept: NEUROLOGY | Age: 55
End: 2020-11-02
Payer: COMMERCIAL

## 2020-11-02 ENCOUNTER — TELEPHONE (OUTPATIENT)
Dept: NEUROLOGY | Age: 55
End: 2020-11-02

## 2020-11-02 VITALS
RESPIRATION RATE: 16 BRPM | WEIGHT: 145 LBS | OXYGEN SATURATION: 98 % | SYSTOLIC BLOOD PRESSURE: 124 MMHG | HEIGHT: 62 IN | HEART RATE: 71 BPM | BODY MASS INDEX: 26.68 KG/M2 | DIASTOLIC BLOOD PRESSURE: 88 MMHG

## 2020-11-02 DIAGNOSIS — R20.0 NUMBNESS AND TINGLING IN BOTH HANDS: Primary | ICD-10-CM

## 2020-11-02 DIAGNOSIS — G95.9 CERVICAL MYELOPATHY (HCC): ICD-10-CM

## 2020-11-02 DIAGNOSIS — R20.2 NUMBNESS AND TINGLING IN BOTH HANDS: Primary | ICD-10-CM

## 2020-11-02 PROCEDURE — 99204 OFFICE O/P NEW MOD 45 MIN: CPT | Performed by: PSYCHIATRY & NEUROLOGY

## 2020-11-02 RX ORDER — AMITRIPTYLINE HYDROCHLORIDE 25 MG/1
25 TABLET, FILM COATED ORAL
Qty: 30 TAB | Refills: 0 | Status: SHIPPED | OUTPATIENT
Start: 2020-11-02

## 2020-11-02 NOTE — PROGRESS NOTES
Ms. Edel Bowden presents as a new patient for evaluation of numbness of bilateral hands. Her symptoms began six months ago status post spinal fusion. Depression screening done on patient.

## 2020-11-02 NOTE — PATIENT INSTRUCTIONS
PRESCRIPTION REFILL POLICY [facility] Statement to Patients April 1, 2014 In an effort to ensure the large volume of patient prescription refills is processed in the most efficient and expeditious manner, we are asking our patients to assist us by calling your Pharmacy for all prescription refills, this will include also your  Mail Order Pharmacy. The pharmacy will contact our office electronically to continue the refill process. Please do not wait until the last minute to call your pharmacy. We need at least 48 hours (2days) to fill prescriptions. We also encourage you to call your pharmacy before going to  your prescription to make sure it is ready. With regard to controlled substance prescription refill requests (narcotic refills) that need to be picked up at our office, we ask your cooperation by providing us with at least 72 hours (3days) notice that you will need a refill. We will not refill narcotic prescription refill requests after 4:00pm on any weekday, Monday through Thursday, or after 2:00pm on Fridays, or on the weekends. We encourage everyone to explore another way of getting your prescription refill request processed using [employer], our patient web portal through our electronic medical record system. [employer] is an efficient and effective way to communicate your medication request directly to the office and  downloadable as an glendy on your smart phone . [employer] also features a review functionality that allows you to view your medication list as well as leave messages for your physician. Are you ready to get connected? If so please review the attatched instructions or speak to any of our staff to get you set up right away! Thank you so much for your cooperation. Should you have any questions please contact our Practice Administrator. The Physicians and Staff,  [facility]

## 2020-11-02 NOTE — PROGRESS NOTES
Chief Complaint   Patient presents with    Neurologic Problem         HISTORY OF PRESENT ILLNESS  Santhosh Longo is a 47 y.o. female who came in for neurological consultation requested by her orthopedic surgeon at [address] [place] [address]. She was found to have advanced degenerative cervical disc and joint disease that was causing paresthesias in her hands and MRI imaging revealed cord compression with cord edema. She underwent cervical discectomy and fusion surgery about 5 months ago. She has continued to experience numbness and tingling sensation in the fingertips, pain meds off and on and has been dropping things. Denies any weakness in the arms. Still has some neck pain. She had EMG/NCV done which was reportedly normal.   She had repeat MRI in September which reportedly showed stable postoperative findings without any residual nerve root or cord compression. There is residual myelomalacia and focal volume loss at C4-C5 level of the spinal cord. Past Medical History:   Diagnosis Date    Anxiety     Hypertension     Ill-defined condition 2020    kidney stones     Current Outpatient Medications   Medication Sig    amitriptyline (ELAVIL) 25 mg tablet Take 1 Tab by mouth nightly.  diazepam (VALIUM) 5 mg tablet Take 1 Tab by mouth every eight (8) hours as needed for Anxiety.  norethindrone (MICRONOR) 0.35 mg tab Take  by mouth daily. No current facility-administered medications for this visit.       Allergies   Allergen Reactions    Penicillin G Hives     Old allergy - does not recall if keflex     Family History   Problem Relation Age of Onset    Hypertension Mother     Heart Disease Father     Hypertension Sister     Hypertension Brother      Social History     Tobacco Use    Smoking status: Light Tobacco Smoker     Packs/day: 0.25     Years: 25.00     Pack years: 6.25    Smokeless tobacco: Never Used   Substance Use Topics    Alcohol use: Not Currently    Drug use: Not Currently     Past Surgical History:   Procedure Laterality Date    HX GYN  1988    c/section under [place]    HX HEENT  1986    tonsilectomy         REVIEW OF SYSTEMS  Review of Systems - History obtained from the patient  Psychological ROS: negative  ENT ROS: negative  Hematological and Lymphatic ROS: negative  Endocrine ROS: negative  Respiratory ROS: no cough, shortness of breath, or wheezing  Cardiovascular ROS: no chest pain or dyspnea on exertion  Gastrointestinal ROS: no abdominal pain, change in bowel habits, or black or bloody stools  Genito-Urinary ROS: no dysuria, trouble voiding, or hematuria  Musculoskeletal ROS: negative  Dermatological ROS: negative      PHYSICAL EXAMINATION:    Visit Vitals  /88   Pulse 71   Resp 16   Ht 5' 1.5\" (1.562 m)   Wt 65.8 kg (145 lb)   SpO2 98%   BMI 26.95 kg/m²     General:  Well nourished and groomed individual in no acute distress. Neck: Supple, nontender, no bruits, no pain with resistance to active range of motion. Heart: Regular rate and rhythm. Normal S1S2. Lungs:  Equal chest expansion, no cough, no wheeze  Musculoskeletal:  Extremities revealed no edema and had full range of motion of joints. Psych:  Good mood and bright affect    NEUROLOGICAL EXAMINATION:     Mental Status:   Alert and oriented to person, place, and time with recent and remote memory intact. Attention span and concentration are normal. Speech is fluent. Cranial Nerves:    II, III, IV, VI:  Visual acuity grossly intact. Visual fields are normal.    Pupils are equal, round, and reactive to light and accommodation. Extra-ocular movements are full and fluid. Fundoscopic exam was benign, no ptosis or nystagmus. V-XII: Hearing is grossly intact. Facial features are symmetric, with normal sensation and strength. The palate rises symmetrically and the tongue protrudes midline. Sternocleidomastoids 5/5. Motor Examination: Normal tone, bulk, and strength. 5/5 muscle strength throughout.   No cogwheel rigidity or clonus present. Sensory exam:  Normal throughout to pinprick, temperature, and vibration sense. Normal proprioception. Coordination:  Finger to nose and rapid arm movement testing was normal.   No resting or intention tremor    Gait and Station:  Steady while walking on toes, heels, and with tandem walking. Normal arm swing. No Rhomberg or pronator drift. No muscle wasting or fasiculations noted. Reflexes:  DTRs 2+ throughout. Toes downgoing. LABS / IMAGING  MRI Results (most recent):  Results from Hospital Encounter encounter on 04/22/20   MRI CERV SPINE WO CONT    Narrative EXAM: MRI CERV SPINE WO CONT    INDICATION: . Cervicalgia    COMPARISON: None    TECHNIQUE: MR imaging of the cervical spine was performed using the following  sequences: sagittal T1, T2, STIR;  axial T2, T1.     CONTRAST:  None. FINDINGS: Study is mildly compromised by motion. There is normal alignment of the cervical spine. Vertebral body heights are  maintained. Marrow signal is normal.    The craniocervical junction is intact. The paraspinal soft tissues are within normal limits. C2-C3: No herniation or stenosis. C3-C4: No herniation or stenosis. C4-C5: There is a large central herniation at C4-C5 with severe compression of  the cervical cord with increased signal intensity in the cervical cord at this  level consistent with edema. The neural foramina appear patent. C5-C6: There is disc space narrowing with posterior osteophyte/disc complex more  prominent to the left resulting in moderately severe central stenosis and  narrowing of the right neural foramen. C6-C7: There is disc space narrowing with a posterior osteophyte resulting in  moderate central stenosis and mild narrowing of the neural foramina right  greater than left. .     C7-T1: No herniation or stenosis. Impression IMPRESSION:    1.  There is a large herniation at C4-C5 compressing the cervical cord with  increased signal intensity within the cervical cord at this level consistent  with edema. 2. At C5-C6, there is disc space narrowing with posterior osteophyte/disc  complex resulting in moderately severe central stenosis right side greater than  left and moderate narrowing of the right neural foramen. 3. At C6-C7, there is disc space narrowing with a mild osteophyte/disc complex  posteriorly resulting in moderate central stenosis and mild narrowing of neural  foramina right greater than left. Results called to the office by myself. . 23X             ASSESSMENT    ICD-10-CM ICD-9-CM    1. Numbness and tingling in both hands  R20.0 782. 0 EMG NCV MOTOR WO F/WAVE PER NERVE    R20.2  amitriptyline (ELAVIL) 25 mg tablet   2. Cervical myelopathy (HCC)  G95.9 721.1 amitriptyline (ELAVIL) 25 mg tablet       DISCUSSION  Ms. Gale Pichardo had gout advanced degenerative disc changes and herniation at C4-C5 that was causing cord compression and cord edema. She is a status post ACDF C4-C7. She has continued to experience paresthesias in her fingertips and pain in her hands. This may be due to myelomalacia i.e. damage to the nerve fibers within the spinal cord and may improve over the next several months to a year or may not improve  We will repeat EMG/NCV to see if there may be any superimposed peripheral nerve entrapment such as carpal tunnel  She has tried gabapentin Lyrica and Cymbalta without much benefit  May try amitriptyline 25 mg at bedtime and titrate up if it helps    Thank you for allowing me to participate in the care of Ms. Chris Up. Please feel free to contact me if you have any questions. I will be happy to follow to follow her along with you.     Harpal Mcarthur MD  Diplomate, American Board of Psychiatry & Neurology (Neurology)  [place] Benite[place] Board of Psychiatry & Neurology (Clinical Neurophysiology)  Diplomate, American Board of Electrodiagnostic Medicine

## 2020-11-03 ENCOUNTER — TELEPHONE (OUTPATIENT)
Dept: NEUROLOGY | Age: 55
End: 2020-11-03

## 2021-11-23 ENCOUNTER — TELEPHONE (OUTPATIENT)
Dept: NEUROLOGY | Age: 56
End: 2021-11-23

## 2021-11-23 NOTE — TELEPHONE ENCOUNTER
Patient calling needing to cancel appt for 11/23/2021 due to her rushing her dog to the vet. Patient would like to know if she can be seen sooner than next available.  Please call back

## 2022-03-19 PROBLEM — M48.02 CERVICAL STENOSIS OF SPINAL CANAL: Status: ACTIVE | Noted: 2020-04-28

## 2024-10-10 ENCOUNTER — TELEPHONE (OUTPATIENT)
Facility: HOSPITAL | Age: 59
End: 2024-10-10

## 2024-10-10 DIAGNOSIS — Z17.0 MALIGNANT NEOPLASM OF BREAST IN FEMALE, ESTROGEN RECEPTOR POSITIVE, UNSPECIFIED LATERALITY, UNSPECIFIED SITE OF BREAST (HCC): Primary | ICD-10-CM

## 2024-10-10 DIAGNOSIS — C50.919 MALIGNANT NEOPLASM OF BREAST IN FEMALE, ESTROGEN RECEPTOR POSITIVE, UNSPECIFIED LATERALITY, UNSPECIFIED SITE OF BREAST (HCC): Primary | ICD-10-CM

## 2024-10-10 NOTE — TELEPHONE ENCOUNTER
[facility]  Breast Navigator Encounter    Name:    Josey Zhao  Age:    58 y.o.  Diagnosis: breast ca    Interdisciplinary Team:  Med-Onc:      Surg-Onc:  [place]    Rad-Onc:      Plastics:      :      Nurse Navigator:  Yamila JAMISON-RN      Encounter type:  []Patient Initiated  []Navigator Follow-up []Pre-op  []Post-op  []Check-in Prior to First Treatment []Treatment Modality Change  [x]Initial Navigator Encounter []Other:       Narrative: Reached out to patient for initial navigator contact.  I explained what happens at the first consultation - an exam by the physician, a possible ultrasound, then complete discussion of surgical options and other treatment that may be considered.  I encouraged the patient to bring someone with her to this appointment.       Discussed that a breast MRI has been ordered by Dr. Nixon, and is the next step in her work-up.  I told her I will reach out to the imaging navigator to try to get this scheduled prior to her appointment.   I explained the MRI procedure to her. With the help of the imaging navigator I was able to get this MRI scheduled for 10/16 at 12pm [place] with an arrival of 11:30am. I emailed all information and instructions to the patient as well.                        Referrals/Handouts:            Yamila JAMISON, RN.  Breast Cancer Navigator   [facility]  [address] [place]  [address]  Office: [phone]  Cell: [phone]  F: [phone]  [email or web address]  Good Help to Those in Need®

## 2024-10-16 ENCOUNTER — HOSPITAL ENCOUNTER (OUTPATIENT)
Age: 59
Discharge: HOME | End: 2024-10-19
Payer: COMMERCIAL

## 2024-10-16 DIAGNOSIS — C50.919 MALIGNANT NEOPLASM OF BREAST IN FEMALE, ESTROGEN RECEPTOR POSITIVE, UNSPECIFIED LATERALITY, UNSPECIFIED SITE OF BREAST (HCC): ICD-10-CM

## 2024-10-16 DIAGNOSIS — Z17.0 MALIGNANT NEOPLASM OF BREAST IN FEMALE, ESTROGEN RECEPTOR POSITIVE, UNSPECIFIED LATERALITY, UNSPECIFIED SITE OF BREAST (HCC): ICD-10-CM

## 2024-10-16 PROCEDURE — C8908 MRI W/O FOL W/CONT, BREAST,: HCPCS

## 2024-10-16 PROCEDURE — 6360000004 HC RX CONTRAST MEDICATION: Performed by: RADIOLOGY

## 2024-10-16 PROCEDURE — A9585 GADOBUTROL INJECTION: HCPCS | Performed by: RADIOLOGY

## 2024-10-16 RX ORDER — GADOBUTROL 604.72 MG/ML
6 INJECTION INTRAVENOUS
Status: COMPLETED | OUTPATIENT
Start: 2024-10-16 | End: 2024-10-16

## 2024-10-16 RX ADMIN — GADOBUTROL 6 ML: 604.72 INJECTION INTRAVENOUS at 12:34

## 2024-10-18 ENCOUNTER — TELEPHONE (OUTPATIENT)
Age: 59
End: 2024-10-18

## 2024-10-18 ENCOUNTER — SOCIAL WORK (OUTPATIENT)
Age: 59
End: 2024-10-18

## 2024-10-18 ENCOUNTER — OFFICE VISIT (OUTPATIENT)
Age: 59
End: 2024-10-18
Payer: COMMERCIAL

## 2024-10-18 ENCOUNTER — CLINICAL DOCUMENTATION (OUTPATIENT)
Age: 59
End: 2024-10-18

## 2024-10-18 DIAGNOSIS — C50.511 MALIGNANT NEOPLASM OF LOWER-OUTER QUADRANT OF RIGHT BREAST OF FEMALE, ESTROGEN RECEPTOR NEGATIVE (HCC): Primary | ICD-10-CM

## 2024-10-18 DIAGNOSIS — Z17.1 MALIGNANT NEOPLASM OF LOWER-OUTER QUADRANT OF RIGHT BREAST OF FEMALE, ESTROGEN RECEPTOR NEGATIVE (HCC): Primary | ICD-10-CM

## 2024-10-18 PROCEDURE — 93702 BIS XTRACELL FLUID ANALYSIS: CPT | Performed by: SURGERY

## 2024-10-18 PROCEDURE — 99245 OFF/OP CONSLTJ NEW/EST HI 55: CPT | Performed by: SURGERY

## 2024-10-18 PROCEDURE — 76642 ULTRASOUND BREAST LIMITED: CPT | Performed by: SURGERY

## 2024-10-18 PROCEDURE — 99417 PROLNG OP E/M EACH 15 MIN: CPT | Performed by: SURGERY

## 2024-10-18 RX ORDER — IBUPROFEN 600 MG/1
600 TABLET, FILM COATED ORAL EVERY 6 HOURS PRN
COMMUNITY

## 2024-10-18 ASSESSMENT — PATIENT HEALTH QUESTIONNAIRE - PHQ9
SUM OF ALL RESPONSES TO PHQ9 QUESTIONS 1 & 2: 0
1. LITTLE INTEREST OR PLEASURE IN DOING THINGS: NOT AT ALL
SUM OF ALL RESPONSES TO PHQ QUESTIONS 1-9: 0
SUM OF ALL RESPONSES TO PHQ QUESTIONS 1-9: 0
2. FEELING DOWN, DEPRESSED OR HOPELESS: NOT AT ALL
SUM OF ALL RESPONSES TO PHQ QUESTIONS 1-9: 0
SUM OF ALL RESPONSES TO PHQ QUESTIONS 1-9: 0

## 2024-10-18 NOTE — PROGRESS NOTES
tablet by mouth every 6 hours as needed for Pain, Disp: , Rfl:     diazePAM (VALIUM) 5 MG tablet, Take 1 tablet by mouth every 8 hours as needed., Disp: , Rfl:     amitriptyline (ELAVIL) 25 MG tablet, Take 25 mg by mouth (Patient not taking: Reported on 10/18/2024), Disp: , Rfl:     norethindrone (MICRONOR) 0.35 MG tablet, Take by mouth daily (Patient not taking: Reported on 10/18/2024), Disp: , Rfl:   Allergies   Allergen Reactions    Penicillin G Hives     Old allergy - does not recall if keflex        Review of Systems   All other systems reviewed and are negative.         Physical Exam  Vitals and nursing note reviewed.   Chest:   Breasts:     Right: No swelling, bleeding, inverted nipple, mass, nipple discharge, skin change or tenderness.      Left: No swelling, bleeding, inverted nipple, mass, nipple discharge, skin change or tenderness.   Lymphadenopathy:      Upper Body:      Right upper body: No axillary adenopathy.      Left upper body: No axillary adenopathy.        BREAST ULTRASOUND, Preop planning  Indication:preop planning  right Breast 6:00   Technique:  The area was scanned using a high-frequency linear-array near-field transducer  Findings:  irregular mass with dropout  Impression: Biopsy site visible with ultrasound  Disposition:  Will schedule lumpectomy with sentinel lymph node biopsy     ASSESSMENT and PLAN   Diagnosis Orders   1. Malignant neoplasm of lower-outer quadrant of right breast of female, estrogen receptor negative (HCC)  [facility] - Earl Figueroa MD, Hematology/Oncology, [place]        57 yo female with right breast T1 N0  IMC grade 3, ER negative, WY negative, HER2 low expression   I have reviewed the imaging and pathology with her and she was given copies of these reports.       90 minutes were spent face-to-face with the patient during this encounter and 90% of that time was spent on counseling and coordination of care.    1. Discussed lumpectomy and radiation vs mastectomy.

## 2024-10-18 NOTE — TELEPHONE ENCOUNTER
Patient called asking for her CT scan report back, I told patient I had given back to her at the end of her visit, she checked her bag and realized she did have it.   She also wanted to ask if it was okay for her to work out.  I  said in regards to her breast that was fine, but the patient recently had a fall so I told her just to be careful with that and make sure she doesn't re-injure herself.   She was appreciative.

## 2024-10-18 NOTE — PROGRESS NOTES
[facility] Social Work   Psychosocial Assessment    [] Med-Onc [facility] [] Med-Onc [facility] [] Med-Onc [facility] [] Rad-Onc [facility] [] Rad-Onc [facility] [] Rad-Onc [facility] [] Rad-Onc [facility] [x] Breast Center [] [facility]       Patient: Josey Zhao    Reason for Assessment:    [] Social Work Referral  [x] Initial Assessment  [x] New Diagnosis  [] Other:     Advance Care Planning:  [] AMD Discussed and Completed  [] AMD Reviewed Verbally [] AMD Copy Provided  [x] Conversation Deferred [] Conversation Declined      Sources of Information:    [x] Patient  [x] Family  [x] Staff  [x] Medical Record    Mental Status:    [x] Alert  [] Lethargic  [] Unresponsive  [] Unable to assess   Oriented to: [x] Person  [x] Place  [x] Time  [x] Situation      Relationship Status:  [] Single  [x]   [] Significant Other/Life Partner  []   []   []     Living Circumstances:  [] Lives Alone  [x] Family/Significant Other in Household  [] Roommates  [] Children in the Home  [] Paid Caregivers  [] Assisted Living Facility/Group Home  [] Skilled Nursing Facility  [] Homeless  [] Incarcerated  [] Environmental/Care Concerns  [] Other:    Employment Status:   [] Employed Full-time  [] Employed Part-time  [] Homemaker  [] Retired  [] Short-Term Disability  [] Long-Term Disability  [x] Unemployed  [] SSI  [] SSDI  [] Self-Employment    Transportation Resources:   [x] Self  [x] Family/Friends  [] Insurance  [] Community Programs  [] Other:    Barriers to Learning:    [] Language  [] Developmental  [] Cognitive  [] Altered Mental Status  [] Visual/Hearing Impairment  [] Unable to Read/Write  [] Motivational  [] Challenges Understanding Medical Jargon [x] No Barriers Identified      Financial/Legal Concerns:    [] Uninsured  [] Limited Income/Resources  [] Non-Citizen  [] Food Insecurity [x] No Concerns Identified   [] Other:    Samaritan/Spiritual/Existential:   Does patient have any spiritual or Religion beliefs? [x] Yes [] No  Is the 
4 = No assist / stand by assistance

## 2024-10-18 NOTE — PROGRESS NOTES
NCCN Distress Thermometer    Date Screening Completed: 10/18/24    Screening Declined:  [] Yes    Number that best describes how much distress you've experienced in the past week, including today?  0 [] - No distress 1 []      2 []      3 []      4 []       5 []       6 []      7 []      8 []      9 []       10 [] - Extreme distress Did not answer-- wrote \"It has been good for the last 7-8 days :) But prior it was up and down    PROBLEM LIST  Have you had concerns about any of the items below in the past week, including today?      Physical Concerns Practical Concerns   [] Pain [] Taking care of myself    [] Sleep [] Taking care of others    [] Fatigue [] Work   [] Tobacco use  [] School   [] Substance use  [] Housing   [] Memory or concentration [] Finances   [] Sexual health [] Insurance   [] Changes in eating  [] Transportation   [] Loss or change of physical abilities  []     [] Having enough food   Emotional Concerns [] Access to medicine   [x] Worry or anxiety [] Treatment decisions   [x] Sadness or depression    [] Loss of interest or enjoyment  Spiritual or Temple Concerns   [] Grief or loss  [] Sense of meaning or purpose   [x] Fear [] Changes in reanna or beliefs   [] Loneliness  [] Death, dying, or afterlife   [] Anger [] Conflict between beliefs and cancer treatments    [] Changes in appearance [] Relationship with the sacred   [] Feelings of worthlessness or being a burden [] Ritual or dietary needs        Social Concerns     [] Relationship with spouse or partner     [] Relationship with children    [] Relationship with family members     [] Relationship with friends or coworkers     [] Communication with health care team     [] Ability to have children     [] Prejudice or discrimination        Other Concerns:     Patient received resource information and education:  [x] Yes  [] No

## 2024-10-24 ENCOUNTER — PREP FOR PROCEDURE (OUTPATIENT)
Age: 59
End: 2024-10-24

## 2024-10-24 DIAGNOSIS — C50.511 MALIGNANT NEOPLASM OF LOWER-OUTER QUADRANT OF RIGHT BREAST OF FEMALE, ESTROGEN RECEPTOR NEGATIVE (HCC): Primary | ICD-10-CM

## 2024-10-24 DIAGNOSIS — Z17.1 MALIGNANT NEOPLASM OF LOWER-OUTER QUADRANT OF RIGHT BREAST OF FEMALE, ESTROGEN RECEPTOR NEGATIVE (HCC): Primary | ICD-10-CM

## 2024-10-25 DIAGNOSIS — C50.511 MALIGNANT NEOPLASM OF LOWER-OUTER QUADRANT OF RIGHT BREAST OF FEMALE, ESTROGEN RECEPTOR NEGATIVE (HCC): Primary | ICD-10-CM

## 2024-10-25 DIAGNOSIS — Z17.1 MALIGNANT NEOPLASM OF LOWER-OUTER QUADRANT OF RIGHT BREAST OF FEMALE, ESTROGEN RECEPTOR NEGATIVE (HCC): Primary | ICD-10-CM

## 2024-10-28 ENCOUNTER — CLINICAL DOCUMENTATION (OUTPATIENT)
Age: 59
End: 2024-10-28

## 2024-10-28 NOTE — PROGRESS NOTES
Patient Surgery Information Sheet      Patient Name:  Josey Zhao  Surgery Date:  December 3, 2024    Type of Surgery:  RIGHT BREAST ULTRASOUND GUIDED LUMPECTOMY RIGHT BREAST SENTINEL NODE BIOPSY    Estimated arrival time 6:30 AM    Arrival time will be confirmed the afternoon before your surgery.    Pre-procedure: Blue dye injection  12/2/2024 at 3:00 pm (arrive at 2:30 pm)    Pre-Operative Testing Department will call to schedule pre-op testing appointment if needed before surgery    Hospital:  [facility]  Address:  [address] [place], [place] [address]  Check in location:  Through the main entrance of [facility] directly to the left at Patient Access    Pre-Operative Instructions:  Will be given at the pre-op appointment.    Special Instructions if needed:     NPO (nothing by mouth) or drinking after midnight the night before Surgery  Patient may shower the morning of, do not use any lotion, deodorant, powders, perfumes or makeup  Patient will need  the morning of surgery     Surgery Scheduler:  Perfecto Mccord

## 2024-10-29 ENCOUNTER — TELEPHONE (OUTPATIENT)
Age: 59
End: 2024-10-29

## 2024-10-29 NOTE — TELEPHONE ENCOUNTER
The patient sent a [employer] message requesting a call from the nurse.      I called her and the patient stated that she was currently at the shop with her car, and that she just scheduled her surgery yesterday and she was nervous about it being so far out.  She is scared about it growing or getting worse.  I reassured her that it was normal to feel this way and that it's okay to be stressed and anxious about it.  I told her we are getting her in as soon as we can, but Dr. Nixon has a very busy surgery schedule lately.  She said that she felt so much better since meeting Dr. Nixon but that she went from high to low.  She became tearful as she said that she was feeling low.  I provided some support before the patient thanked me for my time, I told her to please call back if she needs us before the call ended.      I reached out to REYNOLD Hatfield because I felt that my conversation with her was cut short. The patient may be receptive to more support, reassurance, and resources to help manage her anxiety.

## 2024-10-30 ENCOUNTER — TELEPHONE (OUTPATIENT)
Age: 59
End: 2024-10-30

## 2024-10-30 NOTE — TELEPHONE ENCOUNTER
[facility]  Oncology Social Work Encounter    [] Med-Onc [facility] [] Med-Onc [facility] [] Med-Onc [facility] [] Rad-Onc [facility] [] Rad-Onc [facility] [] Rad-Onc [facility] [] Rad-Onc [facility] [x] Breast Center [] [facility]      Patient: Josey Zhao    Encounter Type:    [] Initial SW Encounter  [] Patient Initiated  [x] Referral  [] Distress/PHQ Screening  [] Other:      Concern(s)/Barrier(s) to Care: emotional burden    Narrative:   SW referral rec'd from team. Called the patient this morning to offer psychosocial support. Pt reported feeling glad that SW called.     Pt shared that she felt like she had a setback yesterday when she found out that her surgery had to be moved from Nov 26 to Dec 3. She feels that she has been managing her anxiety well overall throughout this process, but that delaying her surgery triggered a sense of fear about her cancer spreading or getting worse.     Pt has been able to use positive self talk and her reanna to help cope. She has been focusing on keeping herself busy and maintaining an optimistic mindset, but shared that it is challenging at times to remain positive, as she knows many people who have or are survivors of breast cancer, pt feeling like she is constantly surrounded by the diagnosis. SW engaged in a supportive conversation with the patient, offering validation.     The patient reports having a tough last three years. She had surgery on her neck a few years ago that was met with complications and she now has numbness in her hands. Pt also shared that several of her family members have unfortunately passed away over the past few years and that it has been a long grieving process.     Pt notes she keeps herself busy throughout the day, running errands, meeting with friends, and supporting her sister who is having some health concerns at this time. She continues to feel supported by her immediate family members but says that it is nice to talk with someone who isn't her close friend or family. Encouraged the pt

## 2024-11-04 ENCOUNTER — CLINICAL DOCUMENTATION (OUTPATIENT)
Age: 59
End: 2024-11-04

## 2024-11-04 ENCOUNTER — OFFICE VISIT (OUTPATIENT)
Age: 59
End: 2024-11-04
Payer: COMMERCIAL

## 2024-11-04 VITALS
BODY MASS INDEX: 25.11 KG/M2 | WEIGHT: 133 LBS | HEART RATE: 73 BPM | OXYGEN SATURATION: 98 % | HEIGHT: 61 IN | SYSTOLIC BLOOD PRESSURE: 142 MMHG | TEMPERATURE: 98 F | DIASTOLIC BLOOD PRESSURE: 80 MMHG

## 2024-11-04 DIAGNOSIS — C50.511 MALIGNANT NEOPLASM OF LOWER-OUTER QUADRANT OF RIGHT BREAST OF FEMALE, ESTROGEN RECEPTOR NEGATIVE (HCC): Primary | ICD-10-CM

## 2024-11-04 DIAGNOSIS — Z17.1 MALIGNANT NEOPLASM OF LOWER-OUTER QUADRANT OF RIGHT BREAST OF FEMALE, ESTROGEN RECEPTOR NEGATIVE (HCC): Primary | ICD-10-CM

## 2024-11-04 PROCEDURE — 99205 OFFICE O/P NEW HI 60 MIN: CPT | Performed by: INTERNAL MEDICINE

## 2024-11-04 RX ORDER — DEXAMETHASONE 4 MG/1
8 TABLET ORAL 2 TIMES DAILY WITH MEALS
Qty: 32 TABLET | Refills: 0 | Status: SHIPPED | OUTPATIENT
Start: 2024-11-04

## 2024-11-04 RX ORDER — PROCHLORPERAZINE MALEATE 5 MG/1
5 TABLET ORAL EVERY 6 HOURS PRN
Qty: 60 TABLET | Refills: 1 | Status: SHIPPED | OUTPATIENT
Start: 2024-11-04

## 2024-11-04 RX ORDER — ONDANSETRON 4 MG/1
4-8 TABLET, ORALLY DISINTEGRATING ORAL EVERY 8 HOURS PRN
Qty: 60 TABLET | Refills: 1 | Status: SHIPPED | OUTPATIENT
Start: 2024-11-04

## 2024-11-04 RX ORDER — PALONOSETRON 0.05 MG/ML
0.25 INJECTION, SOLUTION INTRAVENOUS ONCE
OUTPATIENT
Start: 2024-12-09 | End: 2024-12-09

## 2024-11-04 NOTE — PROGRESS NOTES
[facility]  [facility]  [address]  [place] [address]  [phone]        Oncology Consultation Note        Patient: Josey Zhao MRN: [id number]  SSN: [id number]    YOB: 1965  Age: 58 y.o.  Sex: female      Subjective:      Josey Zhao is a 58 y.o. female who I am seeing for a new diagnosis of right sided invasive breast carcinoma. The patient felt a lump in her right breast. A CT done in the hospital revealed a density in the right side. A subsequent mammogram and a biopsy at [facility] revealed a Dx of TNBC. A breast MRI shows a solitary lesion in the breast. She comes in to discuss the role of systemic therapy. She is accompanied by her  who works in the heating and [employer] business. The patient leads an active and healthy lifestyle.       Review of Systems:    Constitutional: negative  Eyes: negative  Ears, Nose, Mouth, Throat, and Face: negative  Respiratory: negative  Cardiovascular: negative  Gastrointestinal: negative  Genitourinary:negative  Integument/Breast: negative  Hematologic/Lymphatic: negative  Musculoskeletal:negative  Neurological: negative        Past Medical History:   Diagnosis Date    Anxiety     Breast cancer (HCC)     Hypertension     Ill-defined condition 2020    kidney stones     Past Surgical History:   Procedure Laterality Date     SECTION  88    GYN      c/section under [place]    HEENT      tonsilectomy      Family History   Problem Relation Age of Onset    Hypertension Mother     Heart Disease Father     Hypertension Sister     Hypertension Brother      Social History     Tobacco Use    Smoking status: Light Smoker     Current packs/day: 0.25     Average packs/day: 0.3 packs/day for 15.0 years (3.8 ttl pk-yrs)     Types: Cigarettes    Smokeless tobacco: Never   Substance Use Topics    Alcohol use: Not Currently      Prior to Admission medications

## 2024-11-04 NOTE — PROGRESS NOTES
[facility] Oncology Social Work   Psychosocial Assessment    {ONCSWLOCATIONS:21055}         Patient: Josey Zhao    Reason for Assessment:    {ONCSWreasonforassessment:00714::\"Social Work Referral\"}    Advance Care Planning:  {ONCSWACP:32079}    Sources of Information:    {ONCSWsourcesofinformation:48068::\"Patient\"}    Mental Status:    {ONCSWmentalstatus:26986}    Relationship Status:  {ONCSWrelationshipstatus:22795}    Living Circumstances:  {ONCSWlivingcircumstances:48924}    Employment Status:   {ONCSWemploymentstatus:62089}    Transportation Resources:   {ONCSWtransportation:47499}    Barriers to Learning:    {ONCSWbarrierstolearnin}    Financial/Legal Concerns:    {ONCSWfinancial/legal:87890}    Buddhism/Spiritual/Existential:   Does patient have any spiritual or Amish beliefs?  [] Yes [] No  Is the patient involved in a spiritual, reanna or Amish community?   [] Yes [] No  Patient expressing spiritual/existential angst?   [] Yes [] No  Notes:    Support System:    Identified Support Person/Group:  {ONCSWsupportsystem:29611}    History of Mental Health/Substance Use Disorder:   {ONCSWmentalhealthhx:95879}    Coping with Illness:   {ONCSWcopingwithillness:10076}           Concern(s)/Barrier(s) to Care:     Narrative: ***        Referral/Handouts:     {Psychosocial Resources:98841::\"Complementary Therapies handout\",\"Support Groups handout\",\"Legal handout\"}    Plan: ***

## 2024-11-04 NOTE — PROGRESS NOTES
Pharmacy Note- Oncology Treatment Education    Josey Zhao is a  58 y.o.female  diagnosed with breast cancer here today for chemotherapy treatment counseling. Ms. Zhao is being treated with TC every 21 days x 4.   Provided education on Docetaxel and cyclophosphamide and premedications - palonosetron and dexamethasone.    Side effects of oncology treatment include s/s infection, anemia, appetite changes, taste changes, thrombocytopenia, fatigue, hair loss/alopecia, bone pain, skin and nail changes, diarrhea/constipation, nausea/vomiting, and mouth sores.    Patient given ways to manage these side effects and when to contact office.     [facility] Handout of medications provided to patient. Ms. Zhao verbalized understanding of the information presented and all of the patient's questions were answered.    Geni Sandoval PharmD, BCOP  For Pharmacy Admin Tracking Only    Program: Medical Group  CPA in place:  Yes  Recommendation Provided To: Patient/Caregiver: 2 via In person  Intervention Detail: New Rx: 3, reason: Needs Additional Therapy  Intervention Accepted By: Patient/Caregiver: 1   Time Spent (min): 15

## 2024-11-04 NOTE — PROGRESS NOTES
Josey Zhao is a 58 y.o. female here for new patient appt for right breast cancer.  Referred by Dr Nixon  Right breast lumpectomy scheduled for 12/3/24.   Pt here with .   Pt nervous for surgeries because of complication back in 2020 on an outpatient surgery.  Had to have CPR and ended up in hospital.     1. Have you been to the ER, urgent care clinic since your last visit?  Hospitalized since your last visit? New Patient    2. Have you seen or consulted any other health care providers outside of the [facility] since your last visit?  Include any pap smears or colon screening.  New Patient

## 2024-11-04 NOTE — PROGRESS NOTES
NCCN Distress Thermometer    Date Screening Completed: 10/4/2024    Screening Declined:  [] Yes    Number that best describes how much distress you've experienced in the past week, including today?  0 [] - No distress 1 [x]      2 []      3 []      4 []       5 []       6 []      7 []      8 []      9 []       10 [] - Extreme distress    PROBLEM LIST  Have you had concerns about any of the items below in the past week, including today?      Physical Concerns Practical Concerns   [] Pain [] Taking care of myself    [] Sleep [] Taking care of others    [] Fatigue [] Work   [] Tobacco use  [] School   [] Substance use  [] Housing   [] Memory or concentration [] Finances   [] Sexual health [] Insurance   [] Changes in eating  [] Transportation   [] Loss or change of physical abilities  []     [] Having enough food   Emotional Concerns [] Access to medicine   [x] Worry or anxiety [] Treatment decisions   [] Sadness or depression    [] Loss of interest or enjoyment  Spiritual or Christianity Concerns   [] Grief or loss  [] Sense of meaning or purpose   [] Fear [] Changes in reanna or beliefs   [] Loneliness  [] Death, dying, or afterlife   [] Anger [] Conflict between beliefs and cancer treatments    [] Changes in appearance [] Relationship with the sacred   [] Feelings of worthlessness or being a burden [] Ritual or dietary needs        Social Concerns     [] Relationship with spouse or partner     [] Relationship with children    [] Relationship with family members     [] Relationship with friends or coworkers     [] Communication with health care team     [] Ability to have children     [] Prejudice or discrimination        Other Concerns:     Patient received resource information and education:  [x] Yes  [] No

## 2024-11-04 NOTE — PROGRESS NOTES
[facility] Social Work   Psychosocial Assessment    [x] Med-Onc [facility] [] Med-Onc [facility] [] Med-Onc [facility] [] Rad-Onc [facility] [] Rad-Onc [facility] [] Rad-Onc [facility] [] Rad-Onc [facility] [] Breast Center [] [facility]       Patient: Josey Zhao    Reason for Assessment:    [] Social Work Referral  [x] Initial Assessment  [x] New Diagnosis  [] Other:     Advance Care Planning:  [] AMD Discussed and Completed  [] AMD Reviewed Verbally [] AMD Copy Provided  [x] Conversation Deferred [] Conversation Declined      Sources of Information:    [x] Patient  [x] Family  [x] Staff  [x] Medical Record    Mental Status:    [x] Alert  [] Lethargic  [] Unresponsive  [] Unable to assess   Oriented to: [x] Person  [x] Place  [x] Time  [x] Situation      Relationship Status:  [] Single  [x]   [] Significant Other/Life Partner  []   []   []     Living Circumstances:  [] Lives Alone  [x] Family/Significant Other in Household  [] Roommates  [] Children in the Home  [] Paid Caregivers  [] Assisted Living Facility/Group Home  [] Skilled Nursing Facility  [] Homeless  [] Incarcerated  [] Environmental/Care Concerns  [] Other:    Employment Status:   [] Employed Full-time  [x] Employed Part-time  [] Homemaker  [] Retired  [] Short-Term Disability  [] Long-Term Disability  [] Unemployed  [] SSI  [] SSDI  [] Self-Employment    Transportation Resources:   [x] Self  [x] Family/Friends  [] Insurance  [] Community Programs  [] Other:    Barriers to Learning:    [] Language  [] Developmental  [] Cognitive  [] Altered Mental Status  [] Visual/Hearing Impairment  [] Unable to Read/Write  [] Motivational  [] Challenges Understanding Medical Jargon [x] No Barriers Identified      Financial/Legal Concerns:    [] Uninsured  [x] Limited Income/Resources  [] Non-Citizen  [] Food Insecurity [x] No Concerns Identified   [] Other:    Scientologist/Spiritual/Existential:   Does patient have any spiritual or Sabianism beliefs? [x] Yes [] No  Is the

## 2024-11-11 ENCOUNTER — TELEPHONE (OUTPATIENT)
Age: 59
End: 2024-11-11

## 2024-11-11 DIAGNOSIS — C50.511 MALIGNANT NEOPLASM OF LOWER-OUTER QUADRANT OF RIGHT BREAST OF FEMALE, ESTROGEN RECEPTOR NEGATIVE (HCC): Primary | ICD-10-CM

## 2024-11-11 DIAGNOSIS — Z17.1 MALIGNANT NEOPLASM OF LOWER-OUTER QUADRANT OF RIGHT BREAST OF FEMALE, ESTROGEN RECEPTOR NEGATIVE (HCC): Primary | ICD-10-CM

## 2024-11-11 NOTE — TELEPHONE ENCOUNTER
Called pt to inquire on bring her in to get fitted the coldcap.  HIPAA verified by two patient identifiers.     She will browse  coldcap.com to familiarize herself with the coldcap.  She will come in on 11/19/24 at 1:30pm for fitting.    She had general questions about what diet. She says she eats healthy and is curious about which protein powders are the best to use as she knows protein is important during chemotherapy. She doesn't eat red meat.  She is aware we have a Dietician who will be reaching out to her soon.    RADHA FROM DR SAN REFERRAL TO DIETICIAN      She asked if okay to get nails done, [facility], I stressed the importance of not getting nails done directly after chemotherapy, better to get done a few days prior to upcoming treatment if she must.

## 2024-11-15 ENCOUNTER — TELEPHONE (OUTPATIENT)
Age: 59
End: 2024-11-15

## 2024-11-15 NOTE — TELEPHONE ENCOUNTER
Cancer Dafter at [facility]   [address] [place] [address]   W: [phone]  F: [phone]      Medical Nutrition Therapy  Nutrition Encounter:    Referral received.  Patient w/ breast cancer.  Called and spoke with patient,  explained that RD is available to address nutrition throughout the spectrum of care.  She usually follows a healthy diet.  Does not eat red meat.  Usually does chicken and fish.  Trying to increase the protein intake.  Does a lot of smoothies using frozen fruits.  Encouraged her to continue with a healthy diet as able.      Ht Readings from Last 1 Encounters:   11/04/24 1.549 m (5' 1\")       Wt Readings from Last 5 Encounters:   11/04/24 60.3 kg (133 lb)       Signed By: DEMETRIA ROBISON RD

## 2024-11-19 ENCOUNTER — CLINICAL DOCUMENTATION (OUTPATIENT)
Age: 59
End: 2024-11-19

## 2024-11-19 ENCOUNTER — HOSPITAL ENCOUNTER (OUTPATIENT)
Facility: HOSPITAL | Age: 59
Discharge: HOME | End: 2024-11-22
Payer: COMMERCIAL

## 2024-11-19 VITALS
BODY MASS INDEX: 24.25 KG/M2 | TEMPERATURE: 98.4 F | HEIGHT: 62 IN | OXYGEN SATURATION: 97 % | DIASTOLIC BLOOD PRESSURE: 86 MMHG | SYSTOLIC BLOOD PRESSURE: 148 MMHG | WEIGHT: 131.8 LBS | HEART RATE: 70 BPM

## 2024-11-19 LAB
BASOPHILS # BLD: 0.1 K/UL (ref 0–0.1)
BASOPHILS NFR BLD: 1 % (ref 0–1)
DIFFERENTIAL METHOD BLD: ABNORMAL
EOSINOPHIL # BLD: 0.1 K/UL (ref 0–0.4)
EOSINOPHIL NFR BLD: 1 % (ref 0–7)
ERYTHROCYTE [DISTWIDTH] IN BLOOD BY AUTOMATED COUNT: 13.1 % (ref 11.5–14.5)
HCT VFR BLD AUTO: 45.7 % (ref 35–47)
HGB BLD-MCNC: 14.8 G/DL (ref 11.5–16)
IMM GRANULOCYTES # BLD AUTO: 0 K/UL (ref 0–0.04)
IMM GRANULOCYTES NFR BLD AUTO: 0 % (ref 0–0.5)
LYMPHOCYTES # BLD: 2.2 K/UL (ref 0.8–3.5)
LYMPHOCYTES NFR BLD: 17 % (ref 12–49)
MCH RBC QN AUTO: 30.2 PG (ref 26–34)
MCHC RBC AUTO-ENTMCNC: 32.4 G/DL (ref 30–36.5)
MCV RBC AUTO: 93.3 FL (ref 80–99)
MONOCYTES # BLD: 0.9 K/UL (ref 0–1)
MONOCYTES NFR BLD: 7 % (ref 5–13)
NEUTS SEG # BLD: 9.2 K/UL (ref 1.8–8)
NEUTS SEG NFR BLD: 74 % (ref 32–75)
NRBC # BLD: 0 K/UL (ref 0–0.01)
NRBC BLD-RTO: 0 PER 100 WBC
PLATELET # BLD AUTO: 254 K/UL (ref 150–400)
PMV BLD AUTO: 12.8 FL (ref 8.9–12.9)
RBC # BLD AUTO: 4.9 M/UL (ref 3.8–5.2)
WBC # BLD AUTO: 12.4 K/UL (ref 3.6–11)

## 2024-11-19 PROCEDURE — 85025 COMPLETE CBC W/AUTO DIFF WBC: CPT

## 2024-11-19 PROCEDURE — 36415 COLL VENOUS BLD VENIPUNCTURE: CPT

## 2024-11-19 NOTE — TELEPHONE ENCOUNTER
Patient given surgery instructions.    Patient Surgery Information Sheet    Patient Name:  Josey Zhao    Surgery Date:  December 3, 2024    Type of Surgery:  Right breast lumpectomy.    Estimated arrival time 6:00 AM    Arrival time will be confirmed the afternoon before your surgery.    Pre-procedure: Blue dye injection  on 12-2-2024 arriving at 2:30 pm    Pre-Operative Testing Department will call to schedule pre-op testing appointment if needed before surgery    Hospital:  [facility]  Address:  [address] [place], [place] [id number]  Check in location:  Through the main entrance of [facility] directly to the left at Patient Access    Pre-Operative Instructions:  Will be given at the pre-op appointment.    Special Instructions if needed:     NPO (nothing by mouth) or drinking after midnight the night before Surgery  Patient may shower the morning of, do not use an lotion, deodorant, powders, perfumes or makeup  Patient will need  the morning of surgery     Surgery Scheduler:  Poonam Acuna

## 2024-11-19 NOTE — PROGRESS NOTES
Pt came in to get fitted her coldcap today and was tearful.  She has a 2 year old granddaughter who is coming to visit in June whichshe does not want to be bald when she comes.  I provided her contact information for [employer] rep and also provided her a pamphlet on stepping stones as well as a back up in case the coldcap is not effective for her.

## 2024-11-20 ENCOUNTER — TELEPHONE (OUTPATIENT)
Age: 59
End: 2024-11-20

## 2024-11-20 NOTE — TELEPHONE ENCOUNTER
Returned call to pt.  HIPAA verified by two patient identifiers.   Pt voiced feeling like yesterday she was having a mental breakdown. She isn't sure about the coldcap due to she has titanium in her neck and she already gets sensitive when exposed to cold and not sure if it is going to be worth it or not. Not sure what kind of shampoo to use. Read taxol can cause permanent hair loss. She has read a lot of differing things and overall not sure what to do. Her  is encouraging her to focus on one thing at a time such as surgery first. After surgery she can come in and discuss the findings and if other options are available.   She is open to having [employer] rep call her as I told her she would be a good resource.  Pt was appreciative of the call.

## 2024-11-20 NOTE — PERIOP NOTE
Message sent via EPIC to Doris Thomas and Shanice at Dr. Nixon's office regarding the following:    WBC - 12.4    Result routed to PCP.  
Preoperative instructions reviewed with patient.  Patient given SSI infection FAQS sheet.  Given two bottles of skin prep chlorhexidine soap; given written and verbal instructions on use. Patient was given the opportunity to ask questions on the information provided.  
an antibiotic before surgery

## 2024-11-22 ENCOUNTER — CLINICAL DOCUMENTATION (OUTPATIENT)
Age: 59
End: 2024-11-22

## 2024-11-22 ENCOUNTER — TELEPHONE (OUTPATIENT)
Age: 59
End: 2024-11-22

## 2024-11-22 NOTE — PROGRESS NOTES
The patient called inquiring if her genetic testing has resulted? She is interested due to her surgery being 12/3/24 and if the results would change her type of surgery that is currently posted? She would like to be notified as soon as it results.

## 2024-11-26 ENCOUNTER — APPOINTMENT (OUTPATIENT)
Facility: HOSPITAL | Age: 59
End: 2024-11-26
Payer: COMMERCIAL

## 2024-11-27 ENCOUNTER — TELEPHONE (OUTPATIENT)
Age: 59
End: 2024-11-27

## 2024-11-27 NOTE — TELEPHONE ENCOUNTER
Patient called saying that her son is in the hospital and she was wondering if she can visit him even though her surgery is coming up. She is worried about getting sick and having to postpone her surgery.  Her son is not hospitalized for a contagious disease. I explained that this was a risk she would have to decide if it's worth it to take, and that if she goes she should make sure to wash her hands afterwards and be mindful of germs while she is there.  She said she is leaning to not going because he is probably going to be discharged today, but she thanked for my advice.

## 2024-12-02 ENCOUNTER — HOSPITAL ENCOUNTER (OUTPATIENT)
Facility: HOSPITAL | Age: 59
Discharge: HOME | End: 2024-12-05
Attending: SURGERY
Payer: COMMERCIAL

## 2024-12-02 DIAGNOSIS — Z17.1 MALIGNANT NEOPLASM OF LOWER-OUTER QUADRANT OF RIGHT BREAST OF FEMALE, ESTROGEN RECEPTOR NEGATIVE (HCC): ICD-10-CM

## 2024-12-02 DIAGNOSIS — C50.511 MALIGNANT NEOPLASM OF LOWER-OUTER QUADRANT OF RIGHT BREAST OF FEMALE, ESTROGEN RECEPTOR NEGATIVE (HCC): ICD-10-CM

## 2024-12-02 PROCEDURE — 78195 LYMPH SYSTEM IMAGING: CPT

## 2024-12-02 PROCEDURE — 3430000000 HC RX DIAGNOSTIC RADIOPHARMACEUTICAL: Performed by: SURGERY

## 2024-12-02 PROCEDURE — A9520 TC99 TILMANOCEPT DIAG 0.5MCI: HCPCS | Performed by: SURGERY

## 2024-12-02 RX ORDER — ONDANSETRON 2 MG/ML
4 INJECTION INTRAMUSCULAR; INTRAVENOUS
Status: CANCELLED | OUTPATIENT
Start: 2024-12-02 | End: 2024-12-03

## 2024-12-02 RX ORDER — FENTANYL CITRATE 50 UG/ML
25 INJECTION, SOLUTION INTRAMUSCULAR; INTRAVENOUS EVERY 5 MIN PRN
Status: CANCELLED | OUTPATIENT
Start: 2024-12-02

## 2024-12-02 RX ORDER — SODIUM CHLORIDE 9 MG/ML
INJECTION, SOLUTION INTRAVENOUS PRN
Status: CANCELLED | OUTPATIENT
Start: 2024-12-02

## 2024-12-02 RX ORDER — PROCHLORPERAZINE EDISYLATE 5 MG/ML
5 INJECTION INTRAMUSCULAR; INTRAVENOUS
Status: CANCELLED | OUTPATIENT
Start: 2024-12-02 | End: 2024-12-03

## 2024-12-02 RX ORDER — SODIUM CHLORIDE 0.9 % (FLUSH) 0.9 %
5-40 SYRINGE (ML) INJECTION EVERY 12 HOURS SCHEDULED
Status: CANCELLED | OUTPATIENT
Start: 2024-12-02

## 2024-12-02 RX ORDER — HYDROMORPHONE HYDROCHLORIDE 1 MG/ML
0.5 INJECTION, SOLUTION INTRAMUSCULAR; INTRAVENOUS; SUBCUTANEOUS EVERY 5 MIN PRN
Status: CANCELLED | OUTPATIENT
Start: 2024-12-02

## 2024-12-02 RX ORDER — SODIUM CHLORIDE 0.9 % (FLUSH) 0.9 %
5-40 SYRINGE (ML) INJECTION PRN
Status: CANCELLED | OUTPATIENT
Start: 2024-12-02

## 2024-12-02 RX ORDER — HYDRALAZINE HYDROCHLORIDE 20 MG/ML
10 INJECTION INTRAMUSCULAR; INTRAVENOUS ONCE
Status: CANCELLED | OUTPATIENT
Start: 2024-12-02 | End: 2024-12-02

## 2024-12-02 RX ORDER — OXYCODONE HYDROCHLORIDE 5 MG/1
5 TABLET ORAL
Status: CANCELLED | OUTPATIENT
Start: 2024-12-02 | End: 2024-12-03

## 2024-12-02 RX ORDER — NALOXONE HYDROCHLORIDE 0.4 MG/ML
INJECTION, SOLUTION INTRAMUSCULAR; INTRAVENOUS; SUBCUTANEOUS PRN
Status: CANCELLED | OUTPATIENT
Start: 2024-12-02

## 2024-12-02 RX ADMIN — TILMANOCEPT 2 MILLICURIE: KIT at 15:00

## 2024-12-03 ENCOUNTER — ANESTHESIA (OUTPATIENT)
Facility: HOSPITAL | Age: 59
End: 2024-12-03
Payer: COMMERCIAL

## 2024-12-03 ENCOUNTER — ANESTHESIA EVENT (OUTPATIENT)
Facility: HOSPITAL | Age: 59
End: 2024-12-03
Payer: COMMERCIAL

## 2024-12-03 ENCOUNTER — HOSPITAL ENCOUNTER (OUTPATIENT)
Facility: HOSPITAL | Age: 59
Setting detail: OUTPATIENT SURGERY
Discharge: HOME | End: 2024-12-03
Attending: SURGERY | Admitting: SURGERY
Payer: COMMERCIAL

## 2024-12-03 VITALS
HEIGHT: 62 IN | SYSTOLIC BLOOD PRESSURE: 131 MMHG | TEMPERATURE: 97.5 F | DIASTOLIC BLOOD PRESSURE: 78 MMHG | RESPIRATION RATE: 16 BRPM | OXYGEN SATURATION: 100 % | HEART RATE: 63 BPM | WEIGHT: 131.8 LBS | BODY MASS INDEX: 24.25 KG/M2

## 2024-12-03 DIAGNOSIS — C50.511 MALIGNANT NEOPLASM OF LOWER-OUTER QUADRANT OF RIGHT BREAST OF FEMALE, ESTROGEN RECEPTOR NEGATIVE (HCC): ICD-10-CM

## 2024-12-03 DIAGNOSIS — Z17.1 MALIGNANT NEOPLASM OF LOWER-OUTER QUADRANT OF RIGHT BREAST OF FEMALE, ESTROGEN RECEPTOR NEGATIVE (HCC): ICD-10-CM

## 2024-12-03 DIAGNOSIS — G89.18 PAIN FOLLOWING SURGERY OR PROCEDURE: Primary | ICD-10-CM

## 2024-12-03 PROCEDURE — 7100000000 HC PACU RECOVERY - FIRST 15 MIN: Performed by: SURGERY

## 2024-12-03 PROCEDURE — 3700000001 HC ADD 15 MINUTES (ANESTHESIA): Performed by: SURGERY

## 2024-12-03 PROCEDURE — 2500000003 HC RX 250 WO HCPCS: Performed by: NURSE ANESTHETIST, CERTIFIED REGISTERED

## 2024-12-03 PROCEDURE — 6360000002 HC RX W HCPCS: Performed by: NURSE ANESTHETIST, CERTIFIED REGISTERED

## 2024-12-03 PROCEDURE — 2580000003 HC RX 258: Performed by: NURSE ANESTHETIST, CERTIFIED REGISTERED

## 2024-12-03 PROCEDURE — C1713 ANCHOR/SCREW BN/BN,TIS/BN: HCPCS | Performed by: SURGERY

## 2024-12-03 PROCEDURE — C9290 INJ, BUPIVACAINE LIPOSOME: HCPCS | Performed by: SURGERY

## 2024-12-03 PROCEDURE — 3600000013 HC SURGERY LEVEL 3 ADDTL 15MIN: Performed by: SURGERY

## 2024-12-03 PROCEDURE — 3600000003 HC SURGERY LEVEL 3 BASE: Performed by: SURGERY

## 2024-12-03 PROCEDURE — 7100000001 HC PACU RECOVERY - ADDTL 15 MIN: Performed by: SURGERY

## 2024-12-03 PROCEDURE — A4648 IMPLANTABLE TISSUE MARKER: HCPCS | Performed by: SURGERY

## 2024-12-03 PROCEDURE — 2709999900 HC NON-CHARGEABLE SUPPLY: Performed by: SURGERY

## 2024-12-03 PROCEDURE — 88307 TISSUE EXAM BY PATHOLOGIST: CPT

## 2024-12-03 PROCEDURE — 3700000000 HC ANESTHESIA ATTENDED CARE: Performed by: SURGERY

## 2024-12-03 PROCEDURE — 6360000002 HC RX W HCPCS: Performed by: SURGERY

## 2024-12-03 RX ORDER — CEFAZOLIN SODIUM 1 G/3ML
INJECTION, POWDER, FOR SOLUTION INTRAMUSCULAR; INTRAVENOUS
Status: DISCONTINUED | OUTPATIENT
Start: 2024-12-03 | End: 2024-12-03 | Stop reason: SDUPTHER

## 2024-12-03 RX ORDER — FENTANYL CITRATE 50 UG/ML
INJECTION, SOLUTION INTRAMUSCULAR; INTRAVENOUS
Status: DISCONTINUED | OUTPATIENT
Start: 2024-12-03 | End: 2024-12-03 | Stop reason: SDUPTHER

## 2024-12-03 RX ORDER — FENTANYL CITRATE 50 UG/ML
100 INJECTION, SOLUTION INTRAMUSCULAR; INTRAVENOUS
Status: DISCONTINUED | OUTPATIENT
Start: 2024-12-03 | End: 2024-12-03 | Stop reason: HOSPADM

## 2024-12-03 RX ORDER — ROCURONIUM BROMIDE 10 MG/ML
INJECTION, SOLUTION INTRAVENOUS
Status: DISCONTINUED | OUTPATIENT
Start: 2024-12-03 | End: 2024-12-03 | Stop reason: SDUPTHER

## 2024-12-03 RX ORDER — SODIUM CHLORIDE 9 MG/ML
INJECTION, SOLUTION INTRAVENOUS PRN
Status: DISCONTINUED | OUTPATIENT
Start: 2024-12-03 | End: 2024-12-03 | Stop reason: HOSPADM

## 2024-12-03 RX ORDER — LIDOCAINE HYDROCHLORIDE 10 MG/ML
1 INJECTION, SOLUTION EPIDURAL; INFILTRATION; INTRACAUDAL; PERINEURAL
Status: DISCONTINUED | OUTPATIENT
Start: 2024-12-03 | End: 2024-12-03 | Stop reason: HOSPADM

## 2024-12-03 RX ORDER — SODIUM CHLORIDE 0.9 % (FLUSH) 0.9 %
5-40 SYRINGE (ML) INJECTION EVERY 12 HOURS SCHEDULED
Status: DISCONTINUED | OUTPATIENT
Start: 2024-12-03 | End: 2024-12-03 | Stop reason: HOSPADM

## 2024-12-03 RX ORDER — MIDAZOLAM HYDROCHLORIDE 2 MG/2ML
2 INJECTION, SOLUTION INTRAMUSCULAR; INTRAVENOUS PRN
Status: DISCONTINUED | OUTPATIENT
Start: 2024-12-03 | End: 2024-12-03 | Stop reason: HOSPADM

## 2024-12-03 RX ORDER — SODIUM CHLORIDE 0.9 % (FLUSH) 0.9 %
5-40 SYRINGE (ML) INJECTION PRN
Status: DISCONTINUED | OUTPATIENT
Start: 2024-12-03 | End: 2024-12-03 | Stop reason: HOSPADM

## 2024-12-03 RX ORDER — SODIUM CHLORIDE, SODIUM LACTATE, POTASSIUM CHLORIDE, CALCIUM CHLORIDE 600; 310; 30; 20 MG/100ML; MG/100ML; MG/100ML; MG/100ML
INJECTION, SOLUTION INTRAVENOUS CONTINUOUS
Status: DISCONTINUED | OUTPATIENT
Start: 2024-12-03 | End: 2024-12-03 | Stop reason: HOSPADM

## 2024-12-03 RX ORDER — ONDANSETRON 2 MG/ML
INJECTION INTRAMUSCULAR; INTRAVENOUS
Status: DISCONTINUED | OUTPATIENT
Start: 2024-12-03 | End: 2024-12-03 | Stop reason: SDUPTHER

## 2024-12-03 RX ORDER — DEXAMETHASONE SODIUM PHOSPHATE 4 MG/ML
INJECTION, SOLUTION INTRA-ARTICULAR; INTRALESIONAL; INTRAMUSCULAR; INTRAVENOUS; SOFT TISSUE
Status: DISCONTINUED | OUTPATIENT
Start: 2024-12-03 | End: 2024-12-03 | Stop reason: SDUPTHER

## 2024-12-03 RX ORDER — ACETAMINOPHEN 500 MG
1000 TABLET ORAL ONCE
Status: DISCONTINUED | OUTPATIENT
Start: 2024-12-03 | End: 2024-12-03 | Stop reason: HOSPADM

## 2024-12-03 RX ORDER — SODIUM CHLORIDE, SODIUM LACTATE, POTASSIUM CHLORIDE, CALCIUM CHLORIDE 600; 310; 30; 20 MG/100ML; MG/100ML; MG/100ML; MG/100ML
INJECTION, SOLUTION INTRAVENOUS
Status: DISCONTINUED | OUTPATIENT
Start: 2024-12-03 | End: 2024-12-03 | Stop reason: SDUPTHER

## 2024-12-03 RX ORDER — SUCCINYLCHOLINE/SOD CL,ISO/PF 200MG/10ML
SYRINGE (ML) INTRAVENOUS
Status: DISCONTINUED | OUTPATIENT
Start: 2024-12-03 | End: 2024-12-03 | Stop reason: SDUPTHER

## 2024-12-03 RX ORDER — LIDOCAINE HYDROCHLORIDE 20 MG/ML
INJECTION, SOLUTION EPIDURAL; INFILTRATION; INTRACAUDAL; PERINEURAL
Status: DISCONTINUED | OUTPATIENT
Start: 2024-12-03 | End: 2024-12-03 | Stop reason: SDUPTHER

## 2024-12-03 RX ORDER — ONDANSETRON 4 MG/1
4 TABLET, FILM COATED ORAL 3 TIMES DAILY PRN
Qty: 15 TABLET | Refills: 0 | Status: SHIPPED | OUTPATIENT
Start: 2024-12-03

## 2024-12-03 RX ORDER — OXYCODONE AND ACETAMINOPHEN 5; 325 MG/1; MG/1
1 TABLET ORAL EVERY 4 HOURS PRN
Qty: 30 TABLET | Refills: 0 | Status: SHIPPED | OUTPATIENT
Start: 2024-12-03 | End: 2024-12-10

## 2024-12-03 RX ORDER — MIDAZOLAM HYDROCHLORIDE 1 MG/ML
INJECTION, SOLUTION INTRAMUSCULAR; INTRAVENOUS
Status: DISCONTINUED | OUTPATIENT
Start: 2024-12-03 | End: 2024-12-03 | Stop reason: SDUPTHER

## 2024-12-03 RX ADMIN — ROCURONIUM BROMIDE 25 MG: 10 INJECTION, SOLUTION INTRAVENOUS at 07:49

## 2024-12-03 RX ADMIN — LIDOCAINE HYDROCHLORIDE 40 MG: 20 INJECTION, SOLUTION EPIDURAL; INFILTRATION; INTRACAUDAL; PERINEURAL at 07:42

## 2024-12-03 RX ADMIN — ONDANSETRON HYDROCHLORIDE 4 MG: 2 INJECTION, SOLUTION INTRAMUSCULAR; INTRAVENOUS at 08:36

## 2024-12-03 RX ADMIN — DEXAMETHASONE SODIUM PHOSPHATE 8 MG: 4 INJECTION, SOLUTION INTRAMUSCULAR; INTRAVENOUS at 07:51

## 2024-12-03 RX ADMIN — ROCURONIUM BROMIDE 5 MG: 10 INJECTION, SOLUTION INTRAVENOUS at 07:42

## 2024-12-03 RX ADMIN — MIDAZOLAM 2 MG: 1 INJECTION INTRAMUSCULAR; INTRAVENOUS at 07:29

## 2024-12-03 RX ADMIN — FENTANYL CITRATE 50 MCG: 50 INJECTION INTRAMUSCULAR; INTRAVENOUS at 07:42

## 2024-12-03 RX ADMIN — Medication 140 MG: at 07:42

## 2024-12-03 RX ADMIN — FENTANYL CITRATE 50 MCG: 50 INJECTION INTRAMUSCULAR; INTRAVENOUS at 08:00

## 2024-12-03 RX ADMIN — SUGAMMADEX 200 MG: 100 INJECTION, SOLUTION INTRAVENOUS at 08:43

## 2024-12-03 RX ADMIN — FENTANYL CITRATE 50 MCG: 50 INJECTION INTRAMUSCULAR; INTRAVENOUS at 07:48

## 2024-12-03 RX ADMIN — SODIUM CHLORIDE, POTASSIUM CHLORIDE, SODIUM LACTATE AND CALCIUM CHLORIDE: 600; 310; 30; 20 INJECTION, SOLUTION INTRAVENOUS at 07:29

## 2024-12-03 RX ADMIN — CEFAZOLIN 2 G: 330 INJECTION, POWDER, FOR SOLUTION INTRAMUSCULAR; INTRAVENOUS at 07:50

## 2024-12-03 ASSESSMENT — PAIN - FUNCTIONAL ASSESSMENT: PAIN_FUNCTIONAL_ASSESSMENT: 0-10

## 2024-12-03 ASSESSMENT — PAIN SCALES - GENERAL
PAINLEVEL_OUTOF10: 0
PAINLEVEL_OUTOF10: 0

## 2024-12-03 NOTE — OP NOTE
[address]  [address]                            OPERATIVE REPORT      PATIENT NAME: YEYO SHOOK              : 1965  MED REC NO: [id number]                       ROOM: ASU  ACCOUNT NO: [id number]                       ADMIT DATE: 2024  PROVIDER: Floresita Nixon MD    DATE OF SERVICE:  2024    PREOPERATIVE DIAGNOSES:  Right breast cancer, lower outer quadrant.    POSTOPERATIVE DIAGNOSES:  Right breast cancer, lower outer quadrant.    PROCEDURES PERFORMED:  Right breast ultrasound-guided lumpectomy, right deep axillary sentinel node biopsy, intraoperative margin assessment using RF spectroscopy, VeraForm suture placement.    SURGEON:  Floresita Nixon MD    ASSISTANT:  Chava Marshall.    ANESTHESIA:  General.    ESTIMATED BLOOD LOSS:  Minimal.    SPECIMENS REMOVED:       1. Right axillary sentinel node #1.     2. Right axillary sentinel node #2.     3. Right axillary sentinel node #3.     4. Right breast lumpectomy.     5. Lateral margin.     6. Superior margin.     7. Medial margin.     8. Inferior margin.     9. Anterior margin.     10. Deep margin.    INTRAOPERATIVE FINDINGS:  Node sent for permanent.  [place]dwell Node Biopsy for Breast Cancer - Right  Operation performed with curative intent. Yes   Tracer(s) used to identify sentinel nodes in the upfront surgery (non-neoadjuvant) setting (select all that apply). Radioactive tracer   Tracer(s) used to identify sentinel nodes in the neoadjuvant setting (select all that apply). Radioactive tracer   All nodes (colored or non-colored) present at the end of a dye-filled lymphatic channel were removed. N/A   All significantly radioactive nodes were removed. Yes   All palpably suspicious nodes were removed. N/A   Biopsy-proven positive nodes marked with clips prior to chemotherapy were identified and removed. N/A            COMPLICATIONS:  None.    IMPLANTS:

## 2024-12-03 NOTE — BRIEF OP NOTE
Brief Postoperative Note      Patient: Josey Zhao  YOB: 1965  MRN: [id number]    Date of Procedure: 12/3/2024    Pre-Op Diagnosis Codes:      * Malignant neoplasm of lower-outer quadrant of right female breast (HCC) [C50.511]    Post-Op Diagnosis: Same       Procedure(s):  RIGHT BREAST ULTRASOUND GUIDED LUMPECTOMY, RIGHT BREAST SENTINEL NODE BIOPSY (12/2, 1500)    Surgeon(s):  Floresita Nixon MD    Assistant:  Surgical Assistant: Chava Marshall    Anesthesia: General    Estimated Blood Loss (mL): Minimal    Complications: None    Specimens:   ID Type Source Tests Collected by Time Destination   1 : RIGHT AXILLARY SENTINEL NODE #1 Tissue Lymph Node SURGICAL PATHOLOGY Floresita Nixon MD 12/3/2024 0800    2 : RIGHT AXILLARY SENTINEL NODE #2 Tissue Lymph Node SURGICAL PATHOLOGY Floresita Nixon MD 12/3/2024 0802    3 : RIGHT AXILLARY SENTINEL NODE #3 Tissue Lymph Node SURGICAL PATHOLOGY Floresita Nixon MD 12/3/2024 0803    4 : RIGHT BREAST LUMPECTOMY Tissue Breast SURGICAL PATHOLOGY Floresita Nixon MD 12/3/2024 0814    5 : RIGHT BREAST LATERAL MARGIN Tissue Breast SURGICAL PATHOLOGY Floresita Nixon MD 12/3/2024 0819    6 : RIGHT BREAST SUPERIOR MARGIN Tissue Breast SURGICAL PATHOLOGY Floresita Nixon MD 12/3/2024 0820    7 : RIGHT BREAST MEDIAL MARGIN Tissue Breast SURGICAL PATHOLOGY Floresita Nixon MD 12/3/2024 0824    8 : RIGHT BREAST INFERIOR MARGIN Tissue Breast SURGICAL PATHOLOGY Floresita Nixon MD 12/3/2024 0825    9 : RIGHT BREAST ANTERIOR MARGIN Tissue Breast SURGICAL PATHOLOGY Floresita Nixon MD 12/3/2024 0826    10 : RIGHT BREAST DEEP MARGIN Tissue Breast SURGICAL PATHOLOGY Floresita Nixon MD 12/3/2024 0827        Implants:  * No implants in log *      Drains: * No LDAs found *    Findings:  Infection Present At Time Of Surgery (PATOS) (choose all levels that have infection present):  No infection present  Other Findings: nodes sent for permanent         Electronically signed by

## 2024-12-03 NOTE — PERIOP NOTE
I have reviewed discharge instructions with the  patient and spouse Damion Zhao.  The  patient and spouse Damion Renteriaon verbalized understanding. All questions addressed at this time. A paper copy of these instructions have been given to the patient to take home.

## 2024-12-03 NOTE — ANESTHESIA PRE PROCEDURE
Department of Anesthesiology  Preprocedure Note       Name:  Josey Zhao   Age:  58 y.o.  :  1965                                          MRN:  [id number]         Date:  12/3/2024      Surgeon: Surgeon(s):  Floresita Nixon MD    Procedure: Procedure(s):  RIGHT BREAST ULTRASOUND GUIDED LUMPECTOMY, RIGHT BREAST SENTINEL NODE BIOPSY (1500)    Medications prior to admission:   Prior to Admission medications    Medication Sig Start Date End Date Taking? Authorizing Provider   Probiotic Product (PROBIOTIC DAILY PO) Take by mouth   Yes George Mark MD   ELDERBERRY PO Take by mouth   Yes George Mark MD   Multiple Vitamin (MULTIVITAMIN ADULT PO) Take by mouth   Yes George Mark MD   diazePAM (VALIUM) 5 MG tablet Take 1 tablet by mouth every 8 hours as needed. 3/13/14  Yes Automatic Reconcgeno, Ar   dexAMETHasone (DECADRON) 4 MG tablet Take 2 tablets by mouth 2 times daily (with meals) The day before and the day after chemotherapy  Patient not taking: Reported on 12/3/2024 11/4/24   Earl Figueroa MD   ondansetron (ZOFRAN-ODT) 4 MG disintegrating tablet Take 1-2 tablets by mouth every 8 hours as needed for Nausea or Vomiting  Patient not taking: Reported on 12/3/2024 11/4/24   Earl Figueroa MD   prochlorperazine (COMPAZINE) 5 MG tablet Take 1 tablet by mouth every 6 hours as needed (for nausea or vomiting)  Patient not taking: Reported on 12/3/2024 11/4/24   Earl Figueroa MD   ibuprofen (ADVIL;MOTRIN) 600 MG tablet Take 1 tablet by mouth every 6 hours as needed for Pain  Patient not taking: Reported on 2024    George Mark MD   amitriptyline (ELAVIL) 25 MG tablet Take 25 mg by mouth  Patient not taking: Reported on 10/18/2024 11/2/20   Automatic Moy Vergara   norethindrone (MICRONOR) 0.35 MG tablet Take by mouth daily  Patient not taking: Reported on 10/18/2024    Automatic ReconcMoy lora       Current medications:    Current

## 2024-12-03 NOTE — ANESTHESIA POSTPROCEDURE EVALUATION
Department of Anesthesiology  Postprocedure Note    Patient: Josey Zhao  MRN: [id number]  YOB: 1965  Date of evaluation: 12/3/2024    Procedure Summary       Date: 12/03/24 Room / Location: [facility] ASU  / [facility] AMBULATORY OR    Anesthesia Start: 0729 Anesthesia Stop: 0916    Procedure: RIGHT BREAST ULTRASOUND GUIDED LUMPECTOMY, RIGHT BREAST SENTINEL NODE BIOPSY (12/2, 1500) (Right: Breast) Diagnosis:       Malignant neoplasm of lower-outer quadrant of right female breast (HCC)      (Malignant neoplasm of lower-outer quadrant of right female breast (HCC) [C50.511])    Surgeons: Floresita Nixon MD Responsible Provider: Isabel Ochoa DO    Anesthesia Type: General ASA Status: 2            Anesthesia Type: General    Shweta Phase I: Shweta Score: 10    Shweta Phase II:      Anesthesia Post Evaluation    Patient location during evaluation: PACU  Patient participation: complete - patient participated  Level of consciousness: awake and alert  Pain score: 0  Airway patency: patent  Nausea & Vomiting: no nausea and no vomiting  Cardiovascular status: blood pressure returned to baseline and hemodynamically stable  Respiratory status: acceptable and room air  Hydration status: euvolemic  Multimodal analgesia pain management approach  Pain management: adequate and satisfactory to patient    No notable events documented.

## 2024-12-03 NOTE — DISCHARGE INSTRUCTIONS
Discharge Instructions from Dr. Nixon    I will call you with the pathology results, typically within 1 week from today.  You may shower, but no hot tubs, swimming pools, or baths until your incision is healed.  No heavy lifting with the affected extremity (nothing greater than 5 pounds), and limit its use for the next 4-5 days.  You may use an ice pack for comfort for the next couple of days, but do not place ice directly on the skin.  Rather, use a towel or clothing to serve as a barrier between skin and ice to prevent injury.  If I placed a drain, follow the drain instructions provided, especially as you keep a record of the drain output.  Follow medication instructions carefully.  Wear surgical bra for 24 hours, then remove. Wear supportive bra at all times.  You will have bruising and swelling  Watch for signs of infection as listed below.  Redness  Swelling  Drainage from the incision or from your nipple that appears infected  Fever over 101.5 degrees for consecutive readings, or over 99.5 if you are currently undergoing chemotherapy.  Call our office (number is below) for a follow-up appointment.  If you have any problems, our phone number is [phone]   You had exparel, a long acting local anesthetic or numbing medication, injected into your surgical site during your procedure today. This medication stays in your system for 96 hours, or 4 days to help with post-operative pain control. You have a teal bracelet on your wrist to indicate this. Please do not remove this bracelet for 4 days, or until Saturday so that other health care providers can know you have had this medication.

## 2024-12-03 NOTE — H&P
therapy NO                        Current Medication      Current Outpatient Medications:     ibuprofen (ADVIL;MOTRIN) 600 MG tablet, Take 1 tablet by mouth every 6 hours as needed for Pain, Disp: , Rfl:     diazePAM (VALIUM) 5 MG tablet, Take 1 tablet by mouth every 8 hours as needed., Disp: , Rfl:     amitriptyline (ELAVIL) 25 MG tablet, Take 25 mg by mouth (Patient not taking: Reported on 10/18/2024), Disp: , Rfl:     norethindrone (MICRONOR) 0.35 MG tablet, Take by mouth daily (Patient not taking: Reported on 10/18/2024), Disp: , Rfl:      Allergies         Allergies   Allergen Reactions    Penicillin G Hives       Old allergy - does not recall if keflex            Review of Systems   All other systems reviewed and are negative.           Physical Exam  Vitals and nursing note reviewed.   Chest:   Breasts:     Right: No swelling, bleeding, inverted nipple, mass, nipple discharge, skin change or tenderness.      Left: No swelling, bleeding, inverted nipple, mass, nipple discharge, skin change or tenderness.   Lymphadenopathy:      Upper Body:      Right upper body: No axillary adenopathy.      Left upper body: No axillary adenopathy.         BREAST ULTRASOUND, Preop planning  Indication:preop planning  right Breast 6:00   Technique:  The area was scanned using a high-frequency linear-array near-field transducer  Findings:  irregular mass with dropout  Impression: Biopsy site visible with ultrasound  Disposition:  Will schedule lumpectomy with sentinel lymph node biopsy      ASSESSMENT and PLAN    Diagnosis Orders   1. Malignant neoplasm of lower-outer quadrant of right breast of female, estrogen receptor negative (HCC)  [facility] - Earl Figueroa MD, Hematology/Oncology, [place]          59 yo female with right breast T1 N0  IMC grade 3, ER negative, CT negative, HER2 low expression   I have reviewed the imaging and pathology with her and she was given copies of these reports.         90 minutes were spent

## 2024-12-04 ENCOUNTER — TELEPHONE (OUTPATIENT)
Age: 59
End: 2024-12-04

## 2024-12-04 NOTE — TELEPHONE ENCOUNTER
I called patient for post op wellness check.  Patient is not experiencing any issues and was appreciative of the call.  Patient would like a call back to schedule her post op appointment, message sent to PSRs.

## 2024-12-10 ENCOUNTER — TELEPHONE (OUTPATIENT)
Age: 59
End: 2024-12-10

## 2024-12-18 ENCOUNTER — TELEPHONE (OUTPATIENT)
Age: 59
End: 2024-12-18

## 2024-12-18 ENCOUNTER — OFFICE VISIT (OUTPATIENT)
Age: 59
End: 2024-12-18

## 2024-12-18 DIAGNOSIS — C50.511 MALIGNANT NEOPLASM OF LOWER-OUTER QUADRANT OF RIGHT BREAST OF FEMALE, ESTROGEN RECEPTOR NEGATIVE (HCC): Primary | ICD-10-CM

## 2024-12-18 DIAGNOSIS — Z17.1 MALIGNANT NEOPLASM OF LOWER-OUTER QUADRANT OF RIGHT BREAST OF FEMALE, ESTROGEN RECEPTOR NEGATIVE (HCC): Primary | ICD-10-CM

## 2024-12-18 PROCEDURE — 99024 POSTOP FOLLOW-UP VISIT: CPT | Performed by: SURGERY

## 2024-12-18 NOTE — PROGRESS NOTES
HISTORY OF PRESENT ILLNESS  Josey Zhao is a 58 y.o. female     HPI ESTABLISHED patient here for post op visit.  She is healing well and denies any issues.     12/03/2024 - RIGHT breast US guided lumpectomy, RIGHT breast SLN Bx     SURGICAL PATHOLOGY REPORT       Procedures/Addenda   Addendum Diagnosis (R)   Date Ordered: 12/10/2024     Status: Signed Out   Date Complete: 12/10/2024     By: Zoran Gabriel   Date Reported: 12/10/2024       Interpretation   After further investigation the tumor size of the invasive focus is 10 x   10 x 9 mm.     The updated synoptic report is as follows:       SYNOPTIC REPORT:   INVASIVE CARCINOMA OF THE BREAST: Resection          SPECIMEN       Procedure:  Excision (less than total mastectomy)   Specimen Laterality:  Right   TUMOR       Histologic Type:  Invasive carcinoma of no special type (ductal)   Glandular (Acinar) / Tubular Differentiation:  Score 3   Nuclear Pleomorphism:  Score 3   Mitotic Rate:  Score 2   Overall Grade:  Grade 3 (scores of 8 or 9)   Tumor Size:  Greatest dimension of largest invasive focus (Millimeters) -   10 x 10 x 10 mm   Ductal Carcinoma In Situ (DCIS):  Present        Architectural Patterns:  Comedo, Cribriform   Nuclear Grade:  Grade III (high)   Necrosis:  Present, central (expansive \"comedo\" necrosis)        Lymphatic and / or Vascular Invasion:  Not identified   Treatment Effect in the Breast:  No known presurgical therapy   MARGINS       Margin Status for Invasive Carcinoma:  All margins negative for   invasive carcinoma        Distance from Invasive Carcinoma to Closest Margin:  1 mm   Closest Margin(s) to Invasive Carcinoma:  Anterior   Distance from Invasive Carcinoma to Anterior Margin:  1 mm   Distance from Invasive Carcinoma to Medial Margin:  3 mm        Margin Status for DCIS:  All margins negative for DCIS        Distance from DCIS to Closest Margin:  Greater than - 2 mm   Closest Margin(s) to DCIS:  Medial   Distance from DCIS to

## 2024-12-30 ENCOUNTER — TELEPHONE (OUTPATIENT)
Age: 59
End: 2024-12-30

## 2025-01-02 ENCOUNTER — TELEPHONE (OUTPATIENT)
Age: 60
End: 2025-01-02

## 2025-01-02 NOTE — TELEPHONE ENCOUNTER
Called pt to inquire on her appointment for this morning at 0830 for chemo.  No answer.  Left VM asking for a return call.

## 2025-01-17 ENCOUNTER — TELEPHONE (OUTPATIENT)
Age: 60
End: 2025-01-17

## 2025-06-18 ENCOUNTER — OFFICE VISIT (OUTPATIENT)
Age: 60
End: 2025-06-18
Payer: COMMERCIAL

## 2025-06-18 VITALS — BODY MASS INDEX: 23.92 KG/M2 | WEIGHT: 130 LBS | HEIGHT: 62 IN

## 2025-06-18 DIAGNOSIS — C50.511 MALIGNANT NEOPLASM OF LOWER-OUTER QUADRANT OF RIGHT BREAST OF FEMALE, ESTROGEN RECEPTOR NEGATIVE (HCC): Primary | ICD-10-CM

## 2025-06-18 DIAGNOSIS — Z17.1 MALIGNANT NEOPLASM OF LOWER-OUTER QUADRANT OF RIGHT BREAST OF FEMALE, ESTROGEN RECEPTOR NEGATIVE (HCC): Primary | ICD-10-CM

## 2025-06-18 PROCEDURE — 99213 OFFICE O/P EST LOW 20 MIN: CPT | Performed by: SURGERY

## 2025-06-18 NOTE — PROGRESS NOTES
HISTORY OF PRESENT ILLNESS  Josey Zhao is a 59 y.o. female     HPI ESTABLISHED Patient here for follow up RIGHT breast cancer. Denies pain or changes to the breast area.       Breast hx-  Dr. Blair xrt 4/20/25 12/03/2024 - RIGHT breast US guided lumpectomy, RIGHT breast SLN Bx   10/04/2024 - RIGHT breast 6:00 biopsy - IMC grade 3, ER negative, PA negative, HER2 low expression      Breast hx -  Age 15 - benign lump removed from RIGHT breast.        Review of Systems   All other systems reviewed and are negative.        Physical Exam  Vitals and nursing note reviewed.   Chest:   Breasts:     Right: No swelling, bleeding, inverted nipple, mass, nipple discharge, skin change or tenderness.      Left: No swelling, bleeding, inverted nipple, mass, nipple discharge, skin change or tenderness.   Lymphadenopathy:      Upper Body:      Right upper body: No axillary adenopathy.      Left upper body: No axillary adenopathy.            ASSESSMENT and PLAN   Diagnosis Orders   1. Malignant neoplasm of lower-outer quadrant of right breast of female, estrogen receptor negative (HCC)        No evidence local recurrence  Mammograms due 9/2025 and mri 10/2025  Rtc with np after this  20 minutes was spent with patient on counseling and coordination of care.

## (undated) DEVICE — SOLUTION IRRIG 1000ML 0.9% SOD CHL USP POUR PLAS BTL

## (undated) DEVICE — AEGIS 1" DISK 4MM HOLE, PEEL OPEN: Brand: MEDLINE

## (undated) DEVICE — TUBING, SUCTION, 1/4" X 10', STRAIGHT: Brand: MEDLINE

## (undated) DEVICE — BLADE ES ELASTOMERIC COAT INSUL DURABLE BEND UPTO 90DEG

## (undated) DEVICE — HALTER TRACTION HD W/ TRI COTTON LINING FOAM LTX

## (undated) DEVICE — MARKER TISS VERAFORM

## (undated) DEVICE — SOLUTION IV 1000ML 0.9% SOD CHL

## (undated) DEVICE — SPONGE GZ W4XL4IN COT 12 PLY TYP VII WVN C FLD DSGN STERILE

## (undated) DEVICE — 3.0MM PRECISION NEURO (MATCH HEAD)

## (undated) DEVICE — HYPODERMIC SAFETY NEEDLE: Brand: MONOJECT

## (undated) DEVICE — SPONGE GZ W4XL4IN COT 12 PLY TYP VII WVN C FLD DSGN

## (undated) DEVICE — BIPOLAR FORCEPS CORD: Brand: VALLEYLAB

## (undated) DEVICE — BONE WAX WHITE: Brand: BONE WAX WHITE

## (undated) DEVICE — PENCIL SMK EVAC ALL IN 1 DSGN ENH VISIBILITY IMPROVED AIR

## (undated) DEVICE — PROBE DTECT MARGIN F/LUMPECTOMY

## (undated) DEVICE — CASPAR DISTR PIN12MMSTER: Brand: AESCULAP

## (undated) DEVICE — SPONGE: SPECIALTY PEANUT XR 100/CS: Brand: MEDICAL ACTION INDUSTRIES

## (undated) DEVICE — DRAPE,LAPAROTOMY,PCH,STERILE: Brand: MEDLINE

## (undated) DEVICE — COVER,MAYO STAND,STERILE: Brand: MEDLINE

## (undated) DEVICE — GLOVE SURG SZ 65 L12IN FNGR THK79MIL GRN LTX FREE

## (undated) DEVICE — SUTURE PERMAHAND SZ 2-0 L30IN NONABSORBABLE BLK SILK W/O A305H

## (undated) DEVICE — HANDLE LT SNAP ON ULT DURABLE LENS FOR TRUMPF ALC DISPOSABLE

## (undated) DEVICE — SUTURE VCRL SZ 2-0 L18IN ABSRB UD CT-1 L36MM 1/2 CIR J839D

## (undated) DEVICE — MAGNETIC INSTRUMENT PAD 10" X 16"; MEDIUM; DISPOSABLE: Brand: CARDINAL HEALTH

## (undated) DEVICE — 3M™ TEGADERM™ TRANSPARENT FILM DRESSING FRAME STYLE, 1626W, 4 IN X 4-3/4 IN (10 CM X 12 CM), 50/CT 4CT/CASE: Brand: 3M™ TEGADERM™

## (undated) DEVICE — SUT ETHLN 3-0 18IN PS2 BLK --

## (undated) DEVICE — TUBING IRRIG L77IN DIA0.241IN L BOR FOR CYSTO W/ NVENT

## (undated) DEVICE — SUTURE MONOCRYL SZ 4-0 L27IN ABSRB UD L19MM PS-2 1/2 CIR PRIM Y426H

## (undated) DEVICE — SMOKE EVACUATION PENCIL: Brand: VALLEYLAB

## (undated) DEVICE — STERILE POLYISOPRENE POWDER-FREE SURGICAL GLOVES: Brand: PROTEXIS

## (undated) DEVICE — SUTURE VCRL UD BR CT 3-0 18IN CT1 J838D

## (undated) DEVICE — Device

## (undated) DEVICE — COVER,TABLE,60X90,STERILE: Brand: MEDLINE

## (undated) DEVICE — SOLUTION IRRIG 1000ML H2O STRL BLT

## (undated) DEVICE — PLASTICS CHEST BREAST ASU: Brand: MEDLINE INDUSTRIES, INC.

## (undated) DEVICE — GLOVE SURG SZ 6 L12IN FNGR THK79MIL GRN LTX FREE

## (undated) DEVICE — SOL INJ SOD CL 0.9% 1000ML BG --

## (undated) DEVICE — STERILE POLYISOPRENE POWDER-FREE SURGICAL GLOVES WITH EMOLLIENT COATING: Brand: PROTEXIS

## (undated) DEVICE — ADHESIVE SKIN CLOSURE 4X22 CM PREMIERPRO EXOFINFUSION DISP

## (undated) DEVICE — GARMENT,MEDLINE,DVT,INT,CALF,MED, GEN2: Brand: MEDLINE

## (undated) DEVICE — SUTURE VICRYL + SZ 3-0 L27IN ABSRB UD L26MM SH 1/2 CIR VCP416H

## (undated) DEVICE — SUTURE PERMA-HAND SZ 2-0 L30IN NONABSORBABLE BLK L26MM SH K833H

## (undated) DEVICE — SUTURE MONOCRYL SZ 3-0 L27IN ABSRB UD L24MM PS-1 3/8 CIR PRIM Y936H

## (undated) DEVICE — DRAPE MICSCP 65MM LENS FOR LEICA VARI-LENS2

## (undated) DEVICE — COVADERM: Brand: DEROYAL

## (undated) DEVICE — Z DISCONTINUED USE 2717541 SUTURE STRATAFIX SZ 3-0 L30CM NONABSORBABLE UD L26MM FS 3/8

## (undated) DEVICE — SUTURE VICRYL + SZ 2-0 L27IN ABSRB WHT SH 1/2 CIR TAPERCUT VCP417H

## (undated) DEVICE — COVER US PRB W12XL244CM FLD IORT STR TIP

## (undated) DEVICE — FLOSEAL HEMOSTATIC MATRIX, 5 ML: Brand: FLOSEAL

## (undated) DEVICE — GOWN,SIRUS,NONRNF,SETINSLV,2XL,18/CS: Brand: MEDLINE

## (undated) DEVICE — SUT PROL 6-0 18IN RB2 DA BLU --

## (undated) DEVICE — TOTAL TRAY, 16FR 10ML SIL FOLEY, URN: Brand: MEDLINE

## (undated) DEVICE — PREP CHLORAPREP 10.5 ML ORG --

## (undated) DEVICE — SYRINGE MED 10ML LUERLOCK TIP W/O SFTY DISP

## (undated) DEVICE — LIQUIBAND RAPID ADHESIVE 36/CS 0.8ML: Brand: MEDLINE

## (undated) DEVICE — INSULATED BLADE ELECTRODE: Brand: EDGE

## (undated) DEVICE — DRAIN SURG 10FR L1/8IN DIA3.2MM SIL CHN RND HUBLESS FULL

## (undated) DEVICE — SUTURE VCRL SZ 2-0 L36IN ABSRB VLT L36MM CT-1 1/2 CIR J345H

## (undated) DEVICE — 14MM DRILL BIT

## (undated) DEVICE — 3M™ STERI-DRAPE™ INSTRUMENT POUCH 1018: Brand: STERI-DRAPE™

## (undated) DEVICE — SURGICAL PROCEDURE PACK BASIN MAJ SET CUST NO CAUT

## (undated) DEVICE — LAMINECTOMY RICHMOND-LF: Brand: MEDLINE INDUSTRIES, INC.

## (undated) DEVICE — INFECTION CONTROL KIT SYS

## (undated) DEVICE — Z CONVERTED USE 2107985 COVER FLROSCP W36XL28IN 4 SIDE ADH

## (undated) DEVICE — RESERVOIR,SUCTION,100CC,SILICONE: Brand: MEDLINE